# Patient Record
Sex: FEMALE | Race: WHITE | NOT HISPANIC OR LATINO | Employment: OTHER | URBAN - METROPOLITAN AREA
[De-identification: names, ages, dates, MRNs, and addresses within clinical notes are randomized per-mention and may not be internally consistent; named-entity substitution may affect disease eponyms.]

---

## 2023-06-16 ENCOUNTER — OFFICE VISIT (OUTPATIENT)
Dept: PRIMARY CARE | Facility: CLINIC | Age: 68
End: 2023-06-16
Payer: MEDICARE

## 2023-06-16 VITALS
SYSTOLIC BLOOD PRESSURE: 120 MMHG | WEIGHT: 172.8 LBS | BODY MASS INDEX: 29.66 KG/M2 | DIASTOLIC BLOOD PRESSURE: 80 MMHG | TEMPERATURE: 98 F

## 2023-06-16 DIAGNOSIS — I10 PRIMARY HYPERTENSION: Primary | ICD-10-CM

## 2023-06-16 DIAGNOSIS — N81.10 FEMALE BLADDER PROLAPSE: ICD-10-CM

## 2023-06-16 DIAGNOSIS — Z12.31 ENCOUNTER FOR SCREENING MAMMOGRAM FOR MALIGNANT NEOPLASM OF BREAST: ICD-10-CM

## 2023-06-16 DIAGNOSIS — K64.9 HEMORRHOIDS, UNSPECIFIED HEMORRHOID TYPE: ICD-10-CM

## 2023-06-16 PROCEDURE — 1160F RVW MEDS BY RX/DR IN RCRD: CPT | Performed by: NURSE PRACTITIONER

## 2023-06-16 PROCEDURE — 1159F MED LIST DOCD IN RCRD: CPT | Performed by: NURSE PRACTITIONER

## 2023-06-16 PROCEDURE — 3079F DIAST BP 80-89 MM HG: CPT | Performed by: NURSE PRACTITIONER

## 2023-06-16 PROCEDURE — 1036F TOBACCO NON-USER: CPT | Performed by: NURSE PRACTITIONER

## 2023-06-16 PROCEDURE — 3074F SYST BP LT 130 MM HG: CPT | Performed by: NURSE PRACTITIONER

## 2023-06-16 PROCEDURE — 99214 OFFICE O/P EST MOD 30 MIN: CPT | Performed by: NURSE PRACTITIONER

## 2023-06-16 RX ORDER — HYDROCORTISONE ACETATE 25 MG/1
SUPPOSITORY RECTAL
COMMUNITY
End: 2023-06-16 | Stop reason: SDUPTHER

## 2023-06-16 RX ORDER — LOSARTAN POTASSIUM AND HYDROCHLOROTHIAZIDE 12.5; 1 MG/1; MG/1
1 TABLET ORAL DAILY
Qty: 90 TABLET | Refills: 3 | Status: SHIPPED | OUTPATIENT
Start: 2023-06-16 | End: 2024-05-23 | Stop reason: SDUPTHER

## 2023-06-16 RX ORDER — ONDANSETRON 4 MG/1
TABLET, ORALLY DISINTEGRATING ORAL 3 TIMES DAILY PRN
COMMUNITY
Start: 2016-02-03

## 2023-06-16 RX ORDER — IBUPROFEN 800 MG/1
1 TABLET ORAL 3 TIMES DAILY PRN
COMMUNITY
Start: 2014-11-14

## 2023-06-16 RX ORDER — LOSARTAN POTASSIUM AND HYDROCHLOROTHIAZIDE 12.5; 1 MG/1; MG/1
1 TABLET ORAL DAILY
COMMUNITY
End: 2023-06-16 | Stop reason: SDUPTHER

## 2023-06-16 RX ORDER — CLOBETASOL PROPIONATE 0.5 MG/G
CREAM TOPICAL AS NEEDED
COMMUNITY
Start: 2022-08-03 | End: 2023-12-13 | Stop reason: WASHOUT

## 2023-06-16 RX ORDER — SUMATRIPTAN 20 MG/1
SPRAY NASAL AS NEEDED
COMMUNITY
Start: 2016-10-18

## 2023-06-16 RX ORDER — ASPIRIN 81 MG/1
TABLET ORAL ONCE
COMMUNITY
Start: 2021-06-15

## 2023-06-16 RX ORDER — HYDROCORTISONE ACETATE 25 MG/1
25 SUPPOSITORY RECTAL 2 TIMES DAILY PRN
Qty: 24 SUPPOSITORY | Refills: 1 | Status: SHIPPED | OUTPATIENT
Start: 2023-06-16 | End: 2024-05-23 | Stop reason: SDUPTHER

## 2023-06-16 RX ORDER — CHOLECALCIFEROL (VITAMIN D3) 125 MCG
1 CAPSULE ORAL DAILY
COMMUNITY
Start: 2020-12-14

## 2023-06-16 RX ORDER — RIZATRIPTAN BENZOATE 10 MG/1
TABLET ORAL ONCE AS NEEDED
COMMUNITY
Start: 2015-10-21

## 2023-06-16 ASSESSMENT — PATIENT HEALTH QUESTIONNAIRE - PHQ9: 2. FEELING DOWN, DEPRESSED OR HOPELESS: NOT AT ALL

## 2023-06-16 NOTE — PROGRESS NOTES
Subjective   Patient ID: Janie Zhong is a 68 y.o. female who presents for Med Refill (Hydrocortisone AC and Losartan- HCTZ ).    HPI   Back from [facility] in May  Has been on [employer]  on and off - [employer]  and is interested in [employer] [facility] friends on this.  Feeling better at lower weight  Was seeing Aline Cho, GYN started her on ASA years ago.  Has had ongoing issues with Hemorrhoid.  Was told that her bladder was falling a little.    Review of Systems   All other systems reviewed and are negative.        Objective   /80 (BP Location: Left arm)   Temp 36.7 °C (98 °F) (Temporal)   Wt 78.4 kg (172 lb 12.8 oz)   BMI 29.66 kg/m²     Physical Exam  Vitals and nursing note reviewed.   Constitutional:       Appearance: Normal appearance.   HENT:      Head: Normocephalic and atraumatic.      Right Ear: Tympanic membrane and ear canal normal.      Mouth/Throat:      Pharynx: Oropharynx is clear.   Cardiovascular:      Rate and Rhythm: Normal rate and regular rhythm.      Heart sounds: Normal heart sounds.   Pulmonary:      Effort: Pulmonary effort is normal.      Breath sounds: Normal breath sounds.   Abdominal:      General: Bowel sounds are normal.      Palpations: Abdomen is soft.   Musculoskeletal:      Cervical back: Normal range of motion and neck supple.   Skin:     General: Skin is warm.   Neurological:      Mental Status: She is alert and oriented to person, place, and time. Mental status is at baseline.   Psychiatric:         Mood and Affect: Mood normal.         Behavior: Behavior normal.         Thought Content: Thought content normal.         Judgment: Judgment normal.           Assessment/Plan   Problem List Items Addressed This Visit       Encounter for screening mammogram for malignant neoplasm of breast    Relevant Orders    BI mammo bilateral screening tomosynthesis    Female bladder prolapse    Relevant Orders    Referral to Urogynecology    Hemorrhoids    Relevant  Medications    hydrocortisone (Anusol-HC) 25 mg suppository    Primary hypertension - Primary    Relevant Medications    losartan-hydrochlorothiazide (Hyzaar) 100-12.5 mg tablet

## 2023-06-16 NOTE — PATIENT INSTRUCTIONS
Good to see you today.    Refill of medications as needed.  STOP the ASA 81    Mammogram - you can schedule at [phone]  REFER to URO/GYN you can schedule at [phone]  Plan to do fasting labs before wellness visit in December.   Let me know if you need anything.

## 2023-06-27 PROBLEM — Z12.31 ENCOUNTER FOR SCREENING MAMMOGRAM FOR MALIGNANT NEOPLASM OF BREAST: Status: ACTIVE | Noted: 2023-06-27

## 2023-06-27 PROBLEM — I10 PRIMARY HYPERTENSION: Status: ACTIVE | Noted: 2023-06-27

## 2023-06-27 PROBLEM — N81.10 FEMALE BLADDER PROLAPSE: Status: ACTIVE | Noted: 2023-06-27

## 2023-06-27 PROBLEM — K64.9 HEMORRHOIDS: Status: ACTIVE | Noted: 2023-06-27

## 2023-07-17 ENCOUNTER — TELEPHONE (OUTPATIENT)
Dept: PRIMARY CARE | Facility: CLINIC | Age: 68
End: 2023-07-17
Payer: MEDICARE

## 2023-07-17 DIAGNOSIS — R92.8 ABNORMAL MAMMOGRAM OF LEFT BREAST: Primary | ICD-10-CM

## 2023-07-17 DIAGNOSIS — N63.20 MASS OF LEFT BREAST, UNSPECIFIED QUADRANT: ICD-10-CM

## 2023-07-17 NOTE — RESULT ENCOUNTER NOTE
Please let pt know that the radiologist is requesting additional imaging of left breast.  I have ordered Diagnostic and U/S  and she can schedule thru radiology.  I would recommend the breast center at [facility]

## 2023-07-17 NOTE — TELEPHONE ENCOUNTER
----- Message from MIRZA Avelar-CNP sent at 7/17/2023 12:08 PM EDT -----  Please let pt know that the radiologist is requesting additional imaging of left breast.  I have ordered Diagnostic and U/S  and she can schedule thru radiology.  I would recommend the breast center at [facility]

## 2023-07-18 ENCOUNTER — TELEPHONE (OUTPATIENT)
Dept: PRIMARY CARE | Facility: CLINIC | Age: 68
End: 2023-07-18
Payer: MEDICARE

## 2023-07-18 NOTE — RESULT ENCOUNTER NOTE
I tried to call pt - [facility]; then tried to send My Chart but not yet set up.  So the radiologist is requesting additional views of left breast.  Usually this means Diagnostic Mammogram and U/S   When I went to put in order, it looked like there was one.  Did she talk with someone yesterday???

## 2023-07-18 NOTE — TELEPHONE ENCOUNTER
----- Message from MIRZA Avelar-CNP sent at 7/18/2023  1:14 PM EDT -----  I tried to call pt - got ; then tried to send My Chart but not yet set up.  So the radiologist is requesting additional views of left breast.  Usually this means Diagnostic Mammogram and U/S   When I went to put in order, it looked like there was one.  Did she talk with someone yesterday???

## 2023-07-20 ENCOUNTER — TELEPHONE (OUTPATIENT)
Dept: PRIMARY CARE | Facility: CLINIC | Age: 68
End: 2023-07-20
Payer: MEDICARE

## 2023-07-20 NOTE — TELEPHONE ENCOUNTER
----- Message from Lea Eaton MA sent at 7/17/2023 12:49 PM EDT -----  Regarding: Oskar request to return message  Sj Lorenzo here is the results you asked for.  ----- Message -----  From: MIRZA Avelar-CNP  Sent: 7/17/2023  12:08 PM EDT  To: [place] [id number] Clinical Support Staff    Please let pt know that the radiologist is requesting additional imaging of left breast.  I have ordered Diagnostic and U/S  and she can schedule thru radiology.  I would recommend the breast center at [facility]

## 2023-07-26 DIAGNOSIS — L23.9 ALLERGIC DERMATITIS: Primary | ICD-10-CM

## 2023-07-26 RX ORDER — CLOBETASOL PROPIONATE 0.5 MG/G
CREAM TOPICAL 2 TIMES DAILY
Qty: 30 G | Refills: 1 | Status: SHIPPED | OUTPATIENT
Start: 2023-07-26 | End: 2023-12-13 | Stop reason: WASHOUT

## 2023-08-04 ENCOUNTER — TELEPHONE (OUTPATIENT)
Dept: PRIMARY CARE | Facility: CLINIC | Age: 68
End: 2023-08-04
Payer: MEDICARE

## 2023-08-04 DIAGNOSIS — R92.8 ABNORMAL MAMMOGRAM: Primary | ICD-10-CM

## 2023-08-07 ENCOUNTER — TELEPHONE (OUTPATIENT)
Dept: PRIMARY CARE | Facility: CLINIC | Age: 68
End: 2023-08-07

## 2023-08-07 ENCOUNTER — OFFICE VISIT (OUTPATIENT)
Dept: PRIMARY CARE | Facility: CLINIC | Age: 68
End: 2023-08-07
Payer: MEDICARE

## 2023-08-07 VITALS — SYSTOLIC BLOOD PRESSURE: 112 MMHG | WEIGHT: 171.8 LBS | DIASTOLIC BLOOD PRESSURE: 80 MMHG | BODY MASS INDEX: 29.49 KG/M2

## 2023-08-07 DIAGNOSIS — L23.7 POISON IVY DERMATITIS: Primary | ICD-10-CM

## 2023-08-07 DIAGNOSIS — R92.8 ABNORMAL MAMMOGRAM: Primary | ICD-10-CM

## 2023-08-07 PROCEDURE — 1160F RVW MEDS BY RX/DR IN RCRD: CPT | Performed by: INTERNAL MEDICINE

## 2023-08-07 PROCEDURE — 3074F SYST BP LT 130 MM HG: CPT | Performed by: INTERNAL MEDICINE

## 2023-08-07 PROCEDURE — 3079F DIAST BP 80-89 MM HG: CPT | Performed by: INTERNAL MEDICINE

## 2023-08-07 PROCEDURE — 1036F TOBACCO NON-USER: CPT | Performed by: INTERNAL MEDICINE

## 2023-08-07 PROCEDURE — 99213 OFFICE O/P EST LOW 20 MIN: CPT | Performed by: INTERNAL MEDICINE

## 2023-08-07 PROCEDURE — 1159F MED LIST DOCD IN RCRD: CPT | Performed by: INTERNAL MEDICINE

## 2023-08-07 RX ORDER — TRIAMCINOLONE ACETONIDE 1 MG/G
CREAM TOPICAL
COMMUNITY
Start: 2023-06-22 | End: 2023-12-13 | Stop reason: WASHOUT

## 2023-08-07 RX ORDER — ESTRADIOL 0.1 MG/G
CREAM VAGINAL
COMMUNITY
Start: 2023-06-23

## 2023-08-07 RX ORDER — METHYLPREDNISOLONE 4 MG/1
TABLET ORAL
Qty: 21 TABLET | Refills: 0 | Status: SHIPPED | OUTPATIENT
Start: 2023-08-07 | End: 2023-12-13 | Stop reason: WASHOUT

## 2023-08-07 ASSESSMENT — PATIENT HEALTH QUESTIONNAIRE - PHQ9
SUM OF ALL RESPONSES TO PHQ9 QUESTIONS 1 AND 2: 0
1. LITTLE INTEREST OR PLEASURE IN DOING THINGS: NOT AT ALL
2. FEELING DOWN, DEPRESSED OR HOPELESS: NOT AT ALL

## 2023-08-07 NOTE — TELEPHONE ENCOUNTER
----- Message from AB Avelar sent at 8/7/2023  6:51 AM EDT -----  Regarding: FW: Poison Naty  Contact: [phone]  See if anyone can see her today.  Otherwise, put her with me tomorrow.  THANKS  ----- Message -----  From: Rebeca Oliva CMA  Sent: 8/7/2023   6:35 AM EDT  To: AB Avelar  Subject: FW: Poison Ivy                                     ----- Message -----  From: Janie Zhong  Sent: 8/6/2023   7:38 AM EDT  To:  [place] [id number] Clinical Support Staff  Subject: Poison Ivy                                       Hi Maura,    The poison ivy is spreading  on my hips, Stomach, back, and now on my breast.  Should I come in and see you?  Maybe I need a prednisone shot.  The cream you prescribed helped a bit on my forearms , but very expensive.  The small tube was $80 with my insurance coverage.      Thanks,  Danyell

## 2023-08-07 NOTE — PROGRESS NOTES
Subjective   Patient ID: [id number]   [facility]    Janie Zhong is a 68 y.o. female who presents for Poison Ivy (Located on hip and down her back).ity has been going on for 7 days and she was using steroid cream but it is not working.        Objective   Visit Vitals  /80 (BP Location: Left arm, Patient Position: Sitting)      Review of Systems  All 12 systems reviewed, no other abnormality except that mentioned in HPI.    Physical Exam  Constitutional- no abnormality  ENT- no abnormality  Neck- no swelling  CVS- normal S1 and S2, no murmur.  Pulmonary- clear to auscultation,no rhonchi, no wheezes.  Abdomen- normal- liver and spleen, soft, no distension, bowel sound present.  Neurological- all cranial nerves intact, speech and gait normal, no sensory or motor deficiency.  Musculoskeletal- all pulses are normal, normal movement, no joint swelling.  Skin-  red rashes on both buttocks.  psychiatry- no suicidal ideation.  Lymph node- no lyphadenopathy      Assessment/Plan   Problem List Items Addressed This Visit       Poison ivy dermatitis - Primary     Steroid pack.            Michelle Kapoor MD

## 2023-11-09 ENCOUNTER — ANCILLARY PROCEDURE (OUTPATIENT)
Dept: RADIOLOGY | Facility: CLINIC | Age: 68
End: 2023-11-09
Payer: MEDICARE

## 2023-11-09 DIAGNOSIS — N20.0 CALCULUS OF KIDNEY: ICD-10-CM

## 2023-11-09 PROCEDURE — 76770 US EXAM ABDO BACK WALL COMP: CPT | Performed by: RADIOLOGY

## 2023-11-09 PROCEDURE — 76770 US EXAM ABDO BACK WALL COMP: CPT

## 2023-11-20 ENCOUNTER — TELEMEDICINE (OUTPATIENT)
Dept: UROLOGY | Facility: CLINIC | Age: 68
End: 2023-11-20
Payer: MEDICARE

## 2023-11-20 DIAGNOSIS — N20.0 NEPHROLITHIASIS: Primary | ICD-10-CM

## 2023-11-20 PROCEDURE — 99213 OFFICE O/P EST LOW 20 MIN: CPT | Performed by: UROLOGY

## 2023-11-20 NOTE — PROGRESS NOTES
Subjective   This visit was completed via telemedicine. All issues as below were discussed and addressed but no physical exam was performed unless allowed by visual confirmation. If it was felt that the patient should be evaluated in clinic, then they were directed there. Patient verbally consented to visit.    Janie Zhong is a 68 y.o. female with history of gross hematuria with negative workup and nephrolithiasis presenting today for a follow up visit.  She has no new complaints.     I reviewed renal US from 11/9/2023 which shows no definite renal stones or hydronephrosis.           Past Medical History:   Diagnosis Date    Abnormal levels of other serum enzymes 04/16/2019    Abnormal liver enzymes    Cough, unspecified 11/14/2014    Cough    Cough, unspecified 05/21/2014    Cough    Essential (primary) hypertension 04/02/2013    Benign essential hypertension    Hirsutism     Hirsutism    Melena     Blood in stool    Other conditions influencing health status 07/09/2018    History of cough    Personal history of diseases of the skin and subcutaneous tissue     History of alopecia    Personal history of other diseases of the nervous system and sense organs     History of migraine    Personal history of other diseases of the respiratory system 11/14/2014    History of upper respiratory infection    Personal history of other diseases of the respiratory system 07/09/2018    History of acute bronchitis    Personal history of other diseases of the respiratory system 09/22/2017    History of bronchitis     Past Surgical History:   Procedure Laterality Date    TOTAL ABDOMINAL HYSTERECTOMY W/ BILATERAL SALPINGOOPHORECTOMY  02/27/2014    Total Abdominal Hysterectomy With Removal Of Both Ovaries     Family History   Problem Relation Name Age of Onset    Lung cancer Mother      COPD Father      Emphysema Father      Lupus Sister      Lung cancer Brother      No Known Problems Son      No Known Problems Mother's Sister       Breast cancer Maternal Grandmother       Current Outpatient Medications   Medication Sig Dispense Refill    aspirin 81 mg EC tablet Take by mouth 1 time.      cholecalciferol (Vitamin D-3) 125 MCG (5000 UT) capsule Take 1 capsule (125 mcg) by mouth once daily.      clobetasol (Temovate) 0.05 % cream if needed.      clobetasol (Temovate) 0.05 % cream Apply topically 2 times a day. (Patient not taking: Reported on 8/7/2023) 30 g 1    estradiol (Estrace) 0.01 % (0.1 mg/gram) vaginal cream USE A PEA SIZED AMOUNT IN THE VAGINA TWICE WEEKLY      hydrocortisone (Anusol-HC) 25 mg suppository Insert 1 suppository (25 mg) into the rectum 2 times a day as needed for hemorrhoids. 24 suppository 1    ibuprofen 800 mg tablet Take 1 tablet (800 mg) by mouth 3 times a day as needed.      losartan-hydrochlorothiazide (Hyzaar) 100-12.5 mg tablet Take 1 tablet by mouth once daily. 90 tablet 3    methylPREDNISolone (Medrol Dospak) 4 mg tablets Take as directed on package. 21 tablet 0    ondansetron ODT (Zofran-ODT) 4 mg disintegrating tablet Take by mouth 3 times a day as needed.      rizatriptan (Maxalt) 10 mg tablet Take by mouth 1 time if needed.      SUMAtriptan (Imitrex) 20 mg/actuation nasal spray Administer into affected nostril(s) if needed for migraine.      triamcinolone (Kenalog) 0.1 % cream APPLY TO BACK TWICE DAILY FOR 2 WEEKS AS NEEDED FOR ITCH       No current facility-administered medications for this visit.     Allergies   Allergen Reactions    Verapamil Other     Social History     Socioeconomic History    Marital status:      Spouse name: Not on file    Number of children: Not on file    Years of education: Not on file    Highest education level: Not on file   Occupational History    Not on file   Tobacco Use    Smoking status: Never    Smokeless tobacco: Never   Vaping Use    Vaping Use: Never used   Substance and Sexual Activity    Alcohol use: Yes     Comment: rarely    Drug use: Never    Sexual  activity: Not on file   Other Topics Concern    Not on file   Social History Narrative    Not on file     Social Determinants of Health     Financial Resource Strain: Not on file   Food Insecurity: Not on file   Transportation Needs: Not on file   Physical Activity: Not on file   Stress: Not on file   Social Connections: Not on file   Intimate Partner Violence: Not on file   Housing Stability: Not on file       Review of Systems  Pertinent items are noted in HPI.    Objective     Lab Review  Lab Results   Component Value Date    WBC 5.5 06/10/2022    RBC 5.03 06/10/2022    HGB 15.3 06/10/2022    HCT 46.8 (H) 06/10/2022     06/10/2022      Lab Results   Component Value Date    BUN 11 12/01/2022    CREATININE 0.69 12/01/2022     Assessment/Plan   Diagnoses and all orders for this visit:  Nephrolithiasis  -     US renal complete; Future    Nephrolithiasis     I reviewed renal US from 11/9/2023 which shows no definite renal stones or hydronephrosis.     We will continue to monitor with annual renal US.     All questions were answered to the patient's satisfaction. Patient agrees with the plan and wishes to proceed. Follow-up will be scheduled appropriately. ;;    Scribed for Dr. Helton by Deidra Beltrán. I , Dr Helton, have personally reviewed and agreed with the information entered by the Virtual Scribe.     Deidra Beltrán

## 2023-12-13 ENCOUNTER — OFFICE VISIT (OUTPATIENT)
Dept: PRIMARY CARE | Facility: CLINIC | Age: 68
End: 2023-12-13
Payer: MEDICARE

## 2023-12-13 ENCOUNTER — LAB (OUTPATIENT)
Dept: LAB | Facility: LAB | Age: 68
End: 2023-12-13
Payer: MEDICARE

## 2023-12-13 VITALS
BODY MASS INDEX: 28.49 KG/M2 | TEMPERATURE: 97.3 F | WEIGHT: 171 LBS | SYSTOLIC BLOOD PRESSURE: 124 MMHG | DIASTOLIC BLOOD PRESSURE: 80 MMHG | HEIGHT: 65 IN | HEART RATE: 78 BPM | OXYGEN SATURATION: 98 %

## 2023-12-13 DIAGNOSIS — E55.9 VITAMIN D DEFICIENCY: ICD-10-CM

## 2023-12-13 DIAGNOSIS — R53.83 OTHER FATIGUE: ICD-10-CM

## 2023-12-13 DIAGNOSIS — R73.03 PRE-DIABETES: ICD-10-CM

## 2023-12-13 DIAGNOSIS — I10 PRIMARY HYPERTENSION: ICD-10-CM

## 2023-12-13 DIAGNOSIS — E78.5 HYPERLIPIDEMIA, UNSPECIFIED HYPERLIPIDEMIA TYPE: ICD-10-CM

## 2023-12-13 DIAGNOSIS — Z00.00 ROUTINE GENERAL MEDICAL EXAMINATION AT A HEALTH CARE FACILITY: Primary | ICD-10-CM

## 2023-12-13 PROBLEM — K64.9 HEMORRHOID: Status: ACTIVE | Noted: 2023-12-13

## 2023-12-13 PROBLEM — M54.41 ACUTE RIGHT-SIDED BACK PAIN WITH SCIATICA: Status: ACTIVE | Noted: 2023-12-13

## 2023-12-13 PROBLEM — R63.5 ABNORMAL WEIGHT GAIN: Status: ACTIVE | Noted: 2023-12-13

## 2023-12-13 PROBLEM — R30.0 DYSURIA: Status: ACTIVE | Noted: 2023-12-13

## 2023-12-13 PROBLEM — R53.82 CHRONIC FATIGUE: Status: ACTIVE | Noted: 2023-12-13

## 2023-12-13 PROBLEM — M25.551 RIGHT HIP PAIN: Status: ACTIVE | Noted: 2023-12-13

## 2023-12-13 PROBLEM — M54.50 CHRONIC RIGHT-SIDED LOW BACK PAIN: Status: ACTIVE | Noted: 2023-12-13

## 2023-12-13 PROBLEM — R19.00 MASS OF ABDOMEN: Status: ACTIVE | Noted: 2023-12-13

## 2023-12-13 PROBLEM — E66.9 OBESITY (BMI 30-39.9): Status: ACTIVE | Noted: 2023-12-13

## 2023-12-13 PROBLEM — K62.5 BRIGHT RED RECTAL BLEEDING: Status: ACTIVE | Noted: 2023-12-13

## 2023-12-13 PROBLEM — K60.2 RECTAL FISSURE: Status: ACTIVE | Noted: 2023-12-13

## 2023-12-13 PROBLEM — G57.01 PIRIFORMIS SYNDROME OF RIGHT SIDE: Status: ACTIVE | Noted: 2023-12-13

## 2023-12-13 PROBLEM — R51.9 HEADACHE: Status: ACTIVE | Noted: 2023-12-13

## 2023-12-13 PROBLEM — R73.9 HYPERGLYCEMIA: Status: ACTIVE | Noted: 2023-12-13

## 2023-12-13 PROBLEM — R60.0 EDEMA OF BOTH LEGS: Status: ACTIVE | Noted: 2023-12-13

## 2023-12-13 PROBLEM — M15.9 GENERALIZED OSTEOARTHRITIS OF MULTIPLE SITES: Status: ACTIVE | Noted: 2023-12-13

## 2023-12-13 PROBLEM — N89.8 VAGINAL DRYNESS: Status: ACTIVE | Noted: 2023-12-13

## 2023-12-13 PROBLEM — R05.8 ALLERGIC COUGH: Status: ACTIVE | Noted: 2023-12-13

## 2023-12-13 PROBLEM — R31.9 HEMATURIA: Status: ACTIVE | Noted: 2023-12-13

## 2023-12-13 PROBLEM — G89.29 CHRONIC RIGHT-SIDED LOW BACK PAIN: Status: ACTIVE | Noted: 2023-12-13

## 2023-12-13 PROBLEM — L64.9 ANDROGENETIC ALOPECIA: Status: ACTIVE | Noted: 2023-12-13

## 2023-12-13 PROBLEM — K57.32 DIVERTICULITIS OF COLON: Status: ACTIVE | Noted: 2023-12-13

## 2023-12-13 PROBLEM — K59.00 CONSTIPATION: Status: ACTIVE | Noted: 2023-12-13

## 2023-12-13 PROBLEM — R10.9 RIGHT FLANK PAIN: Status: ACTIVE | Noted: 2023-12-13

## 2023-12-13 PROBLEM — B02.29 PHN (POSTHERPETIC NEURALGIA): Status: ACTIVE | Noted: 2023-12-13

## 2023-12-13 LAB
25(OH)D3 SERPL-MCNC: 36 NG/ML (ref 30–100)
ALBUMIN SERPL BCP-MCNC: 4.4 G/DL (ref 3.4–5)
ALP SERPL-CCNC: 98 U/L (ref 33–136)
ALT SERPL W P-5'-P-CCNC: 30 U/L (ref 7–45)
ANION GAP SERPL CALC-SCNC: 10 MMOL/L (ref 10–20)
AST SERPL W P-5'-P-CCNC: 26 U/L (ref 9–39)
BILIRUB SERPL-MCNC: 0.6 MG/DL (ref 0–1.2)
BUN SERPL-MCNC: 16 MG/DL (ref 6–23)
CALCIUM SERPL-MCNC: 10.1 MG/DL (ref 8.6–10.3)
CHLORIDE SERPL-SCNC: 102 MMOL/L (ref 98–107)
CHOLEST SERPL-MCNC: 231 MG/DL (ref 0–199)
CHOLESTEROL/HDL RATIO: 3.6
CO2 SERPL-SCNC: 29 MMOL/L (ref 21–32)
CREAT SERPL-MCNC: 0.68 MG/DL (ref 0.5–1.05)
ERYTHROCYTE [DISTWIDTH] IN BLOOD BY AUTOMATED COUNT: 13.9 % (ref 11.5–14.5)
GFR SERPL CREATININE-BSD FRML MDRD: >90 ML/MIN/1.73M*2
GLUCOSE SERPL-MCNC: 82 MG/DL (ref 74–99)
HCT VFR BLD AUTO: 50.4 % (ref 36–46)
HDLC SERPL-MCNC: 64.7 MG/DL
HGB BLD-MCNC: 16 G/DL (ref 12–16)
LDLC SERPL CALC-MCNC: 136 MG/DL
MCH RBC QN AUTO: 29.7 PG (ref 26–34)
MCHC RBC AUTO-ENTMCNC: 31.7 G/DL (ref 32–36)
MCV RBC AUTO: 94 FL (ref 80–100)
NON HDL CHOLESTEROL: 166 MG/DL (ref 0–149)
NRBC BLD-RTO: 0 /100 WBCS (ref 0–0)
PLATELET # BLD AUTO: 232 X10*3/UL (ref 150–450)
POTASSIUM SERPL-SCNC: 4.1 MMOL/L (ref 3.5–5.3)
PROT SERPL-MCNC: 7.6 G/DL (ref 6.4–8.2)
RBC # BLD AUTO: 5.38 X10*6/UL (ref 4–5.2)
SODIUM SERPL-SCNC: 137 MMOL/L (ref 136–145)
TRIGL SERPL-MCNC: 152 MG/DL (ref 0–149)
TSH SERPL-ACNC: 1.1 MIU/L (ref 0.44–3.98)
VIT B12 SERPL-MCNC: 1144 PG/ML (ref 211–911)
VLDL: 30 MG/DL (ref 0–40)
WBC # BLD AUTO: 6.8 X10*3/UL (ref 4.4–11.3)

## 2023-12-13 PROCEDURE — 1170F FXNL STATUS ASSESSED: CPT | Performed by: NURSE PRACTITIONER

## 2023-12-13 PROCEDURE — 84443 ASSAY THYROID STIM HORMONE: CPT

## 2023-12-13 PROCEDURE — 3079F DIAST BP 80-89 MM HG: CPT | Performed by: NURSE PRACTITIONER

## 2023-12-13 PROCEDURE — 85027 COMPLETE CBC AUTOMATED: CPT

## 2023-12-13 PROCEDURE — 3074F SYST BP LT 130 MM HG: CPT | Performed by: NURSE PRACTITIONER

## 2023-12-13 PROCEDURE — G0439 PPPS, SUBSEQ VISIT: HCPCS | Performed by: NURSE PRACTITIONER

## 2023-12-13 PROCEDURE — 82306 VITAMIN D 25 HYDROXY: CPT

## 2023-12-13 PROCEDURE — 36415 COLL VENOUS BLD VENIPUNCTURE: CPT

## 2023-12-13 PROCEDURE — 80061 LIPID PANEL: CPT

## 2023-12-13 PROCEDURE — 82607 VITAMIN B-12: CPT

## 2023-12-13 PROCEDURE — 83036 HEMOGLOBIN GLYCOSYLATED A1C: CPT

## 2023-12-13 PROCEDURE — 1126F AMNT PAIN NOTED NONE PRSNT: CPT | Performed by: NURSE PRACTITIONER

## 2023-12-13 PROCEDURE — 1036F TOBACCO NON-USER: CPT | Performed by: NURSE PRACTITIONER

## 2023-12-13 PROCEDURE — 1159F MED LIST DOCD IN RCRD: CPT | Performed by: NURSE PRACTITIONER

## 2023-12-13 PROCEDURE — 1160F RVW MEDS BY RX/DR IN RCRD: CPT | Performed by: NURSE PRACTITIONER

## 2023-12-13 PROCEDURE — 99213 OFFICE O/P EST LOW 20 MIN: CPT | Performed by: NURSE PRACTITIONER

## 2023-12-13 PROCEDURE — 80053 COMPREHEN METABOLIC PANEL: CPT

## 2023-12-13 ASSESSMENT — ACTIVITIES OF DAILY LIVING (ADL)
TAKING_MEDICATION: INDEPENDENT
MANAGING_FINANCES: INDEPENDENT
DRESSING: INDEPENDENT
DOING_HOUSEWORK: INDEPENDENT
GROCERY_SHOPPING: INDEPENDENT
BATHING: INDEPENDENT

## 2023-12-13 ASSESSMENT — PATIENT HEALTH QUESTIONNAIRE - PHQ9
1. LITTLE INTEREST OR PLEASURE IN DOING THINGS: NOT AT ALL
2. FEELING DOWN, DEPRESSED OR HOPELESS: NOT AT ALL
SUM OF ALL RESPONSES TO PHQ9 QUESTIONS 1 AND 2: 0
1. LITTLE INTEREST OR PLEASURE IN DOING THINGS: NOT AT ALL
SUM OF ALL RESPONSES TO PHQ9 QUESTIONS 1 AND 2: 0
2. FEELING DOWN, DEPRESSED OR HOPELESS: NOT AT ALL

## 2023-12-13 NOTE — PATIENT INSTRUCTIONS
Labs today and I will follow up   Keep working on the weight loss.  You are due for Pneumococcal 20 vaccine and Consider the RSV vaccine.

## 2023-12-13 NOTE — PROGRESS NOTES
"Subjective   Patient ID: Janie Zhong is a 68 y.o. female who presents for Medicare Annual Wellness Visit Subsequent.    HPI   Here for [facility] today.  Sunitha - kidney stone  Has had no symptoms lately.    Will go to [facility] for the winter.   Spouse just had a lobectomy.  He has been doing well.  She lost weight with Seiglutide.    Feels better at lower weight.    Has gained some back.    Current with Mammogram 8/23    Current with Colonoscopy 11/22     Review of Systems   All other systems reviewed and are negative.      Objective   /80   Pulse 78   Temp 36.3 °C (97.3 °F)   Ht 1.638 m (5' 4.5\")   Wt 77.6 kg (171 lb)   SpO2 98%   BMI 28.90 kg/m²     Physical Exam  Vitals and nursing note reviewed.   Constitutional:       Appearance: Normal appearance.   HENT:      Head: Normocephalic and atraumatic.      Right Ear: Tympanic membrane, ear canal and external ear normal.      Left Ear: Tympanic membrane, ear canal and external ear normal.      Nose: Nose normal.      Mouth/Throat:      Mouth: Mucous membranes are moist.      Pharynx: Oropharynx is clear.   Eyes:      Extraocular Movements: Extraocular movements intact.      Conjunctiva/sclera: Conjunctivae normal.      Pupils: Pupils are equal, round, and reactive to light.   Neck:      Thyroid: No thyroid mass, thyromegaly or thyroid tenderness.   Cardiovascular:      Rate and Rhythm: Normal rate and regular rhythm.      Pulses: Normal pulses.      Heart sounds: Normal heart sounds.   Pulmonary:      Effort: Pulmonary effort is normal.      Breath sounds: Normal breath sounds.   Abdominal:      General: Bowel sounds are normal.      Palpations: Abdomen is soft.   Genitourinary:     Comments: Deferred  Musculoskeletal:         General: Normal range of motion.      Cervical back: Normal range of motion and neck supple.   Skin:     General: Skin is warm.      Capillary Refill: Capillary refill takes 2 to 3 seconds.   Neurological:      Mental Status: She is " alert and oriented to person, place, and time. Mental status is at baseline.   Psychiatric:         Mood and Affect: Mood normal.         Behavior: Behavior normal.         Thought Content: Thought content normal.         Judgment: Judgment normal.         Assessment/Plan   Problem List Items Addressed This Visit             ICD-10-CM    Hyperlipemia E78.5    Relevant Orders    Lipid Panel (Completed)    Other fatigue R53.83    Relevant Orders    CBC (Completed)    TSH with reflex to Free T4 if abnormal (Completed)    Vitamin B12 (Completed)    Pre-diabetes R73.03    Relevant Orders    Hemoglobin A1C (Completed)    Primary hypertension I10    Relevant Orders    Comprehensive Metabolic Panel (Completed)    Routine general medical examination at a health care facility - Primary Z00.00    Vitamin D deficiency E55.9    Relevant Orders    Vitamin D 25-Hydroxy,Total (for eval of Vitamin D levels) (Completed)

## 2023-12-14 LAB
EST. AVERAGE GLUCOSE BLD GHB EST-MCNC: 114 MG/DL
HBA1C MFR BLD: 5.6 %

## 2023-12-16 NOTE — RESULT ENCOUNTER NOTE
Labs are posted with comments. Please let me know if you have questions.  Take care and have a good time in JACQUELINE Padilla DNP

## 2023-12-26 PROBLEM — R53.83 OTHER FATIGUE: Status: ACTIVE | Noted: 2023-12-13

## 2023-12-26 PROBLEM — Z00.00 ROUTINE GENERAL MEDICAL EXAMINATION AT A HEALTH CARE FACILITY: Status: ACTIVE | Noted: 2023-06-27

## 2024-05-01 NOTE — PROGRESS NOTES
Janie Zhong female   1955 69 y.o.   [id number]      Chief Complaint  High risk evaluation    History Of Present Illness  Janie Zhong is a very pleasant 68 year old  woman [facility] for high risk evaluation. She denies trauma to the breast. She has family history of breast cancer in her maternal grandmother, 40s, maternal cousin, 40s and maternal aunt, 60s. She denies breast surgery or biopsy.     BREAST IMAGIN2023 Bilateral screening mammogram, indicates BI-RADS Category 0. Left breast mass warranting additional views. 2023 Left diagnostic mammogram and ultrasound, indicates BI-RADS Category 2. Left breast, circumscribed mass without a sonographic correlate is considered benign as it demonstrates a similar morphology to bilateral benign circumscribed masses over 10 year span of stability.      FEMALE HISTORY: menarche age 14, , first birth age 24, did not breastfeed, no OCP's, surgical menopause age 46 (TH-BSO due to benign fibroids), HRT x 10+ years (Estradiol patch), current use of Estradiol cream for vaginal dryness, scattered fibroglandular tissue     FAMILY CANCER HISTORY:  Maternal Grandmother: Breast cancer, 40s  Maternal Aunt: Breast cancer, 40s  Maternal Cousin: Breast cancer, 60s  Mother: Lung cancer, age 59, smoker  Brother: Lung cancer, 50s, smoker     Surgical History  She has a past surgical history that includes Total abdominal hysterectomy w/ bilateral salpingoophorectomy (2014) and Fetal surgery for congenital hernia.     Social History  She reports that she has never smoked. She has never used smokeless tobacco. She reports current alcohol use. She reports that she does not use drugs.    Family History  Family History   Problem Relation Name Age of Onset    Lung cancer Mother      COPD Father      Emphysema Father      Lupus Sister      Lung cancer Brother      No Known Problems Son      No Known Problems Mother's Sister       Breast cancer Maternal Grandmother          Allergies  Verapamil    Medications  Current Outpatient Medications   Medication Instructions    aspirin 81 mg EC tablet oral, Once    cholecalciferol (Vitamin D-3) 125 MCG (5000 UT) capsule 1 capsule, oral, Daily    estradiol (Estrace) 0.01 % (0.1 mg/gram) vaginal cream USE A PEA SIZED AMOUNT IN THE VAGINA TWICE WEEKLY    hydrocortisone (ANUSOL-HC) 25 mg, rectal, 2 times daily PRN    ibuprofen 800 mg tablet 1 tablet, oral, 3 times daily PRN    losartan-hydrochlorothiazide (Hyzaar) 100-12.5 mg tablet 1 tablet, oral, Daily    ondansetron ODT (Zofran-ODT) 4 mg disintegrating tablet oral, 3 times daily PRN    rizatriptan (Maxalt) 10 mg tablet oral, Once as needed    SUMAtriptan (Imitrex) 20 mg/actuation nasal spray nasal, As needed         REVIEW OF SYSTEMS    Constitutional:  Negative for appetite change, fatigue, fever and unexpected weight change.   HENT:  Negative for ear pain, hearing loss, nosebleeds, sore throat and trouble swallowing.    Eyes:  Negative for discharge, itching and visual disturbance.   Respiratory:  Negative for cough, chest tightness and shortness of breath.    Cardiovascular:  Negative for chest pain, palpitations and leg swelling.   Breast: as indicated in HPI  Gastrointestinal:  Negative for abdominal pain, constipation, diarrhea and nausea.   Endocrine: Negative for cold intolerance and heat intolerance.   Genitourinary:  Negative for dysuria, frequency, hematuria, pelvic pain and vaginal bleeding.   Musculoskeletal:  Negative for arthralgias, back pain, gait problem, joint swelling and myalgias.   Skin:  Negative for color change and rash.   Allergic/Immunologic: Negative for environmental allergies and food allergies.   Neurological:  Negative for dizziness, tremors, speech difficulty, weakness, numbness and headaches.   Hematological:  Does not bruise/bleed easily.   Psychiatric/Behavioral:  Negative for agitation, dysphoric mood and sleep  disturbance. The patient is not nervous/anxious.         Past Medical History  She has a past medical history of Abnormal levels of other serum enzymes (04/16/2019), Cough, unspecified (11/14/2014), Cough, unspecified (05/21/2014), Essential (primary) hypertension (04/02/2013), Hirsutism, Melena, Other conditions influencing health status (07/09/2018), Personal history of diseases of the skin and subcutaneous tissue, Personal history of other diseases of the nervous system and sense organs, Personal history of other diseases of the respiratory system (11/14/2014), Personal history of other diseases of the respiratory system (07/09/2018), and Personal history of other diseases of the respiratory system (09/22/2017).     Physical Exam  Patient is alert and oriented x3 and in a relaxed and appropriate mood. Her gait is steady and hand grasps are equal. Sclera is clear. The breasts are nearly symmetrical. The tissue is soft without palpable abnormalities, discrete nodules or masses. The nipples appear normal. There are multiple lesions of resolving poison ivy on breasts, chest and abdomen. There is no cervical, supraclavicular or axillary lymphadenopathy. Heart rate and rhythm normal, S1 and S2 appreciated. The lungs are clear to auscultation bilaterally. Abdomen is soft and non-tender     Physical Exam     Last Recorded Vitals  There were no vitals filed for this visit.    Relevant Results   Time was spent viewing digital images of the radiology testing with the patient. I explained the results in depth, along with suggested explanation for follow up recommendations based on the testing results. BI-RADS Category    Imaging         Assessment/Plan   High risk surveillance care, normal clinical exam, left breast mass, benign, family history of breast cancer, scattered fibroglandular tissue     Plan: Return in July 2025 for bilateral screening mammogram and office visit.     Patient Discussion/Summary  Your clinical  examination is normal. Proceed to annual mammogram in July 2024, I will call you if the results are abnormal. Please return in July 2025 for bilateral screening mammogram and office visit or sooner if you have any problems or concerns.     High risk breast surveillance care plan:  Yearly mammogram with digital breast tomosynthesis  Twice yearly clinical breast examinations  Breast MRI  Monthly self breast examinations  Vitamin D3 2000 IU/daily (over the counter)  Exercise 3-4 times per week for 45-60 minutes  Limit alcohol to 3-4 drinks per week   Eat a healthy low-fat diet with lots of fruits and vegetables  Risk models indicate personal risk of breast cancer in the next five years and lifetime (age 90)  Breast Cancer Risk Assessment Tool (Eliana): 5 year risk % (average %) and lifetime risk % (average %)  Anthony: 5 year risk % (average %) and lifetime risk % (average %)   Risk model indicates you are eligible for endocrine therapy with Tamoxifen, Raloxifene or Exemestane. Endocrine therapy reduces lifetime risk of breast cancer by 50% when taken for 5 years.    You can see your health information, review clinical summaries from office visits & test results online when you follow your health with MY  Chart, a personal health record. To sign up go to [email or web address]. If you need assistance with signing up or trouble getting into your account call [employer] Patient Line 24/7 at [phone].    My office phone number is [phone] if you need to get in touch with me or have additional questions or concerns. Thank you for choosing [facility] and trusting me as your healthcare provider. I look forward to seeing you again at your next office visit. I am honored to be a provider on your health care team and I remain dedicated to helping you achieve your health goals.      Clarissa Villaseñor, APRN-CNP

## 2024-05-08 ENCOUNTER — APPOINTMENT (OUTPATIENT)
Dept: PRIMARY CARE | Facility: CLINIC | Age: 69
End: 2024-05-08
Payer: MEDICARE

## 2024-05-14 ENCOUNTER — OFFICE VISIT (OUTPATIENT)
Dept: PRIMARY CARE | Facility: CLINIC | Age: 69
End: 2024-05-14
Payer: MEDICARE

## 2024-05-14 ENCOUNTER — LAB (OUTPATIENT)
Dept: LAB | Facility: LAB | Age: 69
End: 2024-05-14
Payer: MEDICARE

## 2024-05-14 VITALS
WEIGHT: 165 LBS | BODY MASS INDEX: 27.88 KG/M2 | OXYGEN SATURATION: 93 % | SYSTOLIC BLOOD PRESSURE: 132 MMHG | TEMPERATURE: 98.4 F | DIASTOLIC BLOOD PRESSURE: 88 MMHG | HEART RATE: 104 BPM

## 2024-05-14 DIAGNOSIS — R59.9 ADENOPATHY: ICD-10-CM

## 2024-05-14 DIAGNOSIS — J01.00 SUBACUTE MAXILLARY SINUSITIS: Primary | ICD-10-CM

## 2024-05-14 DIAGNOSIS — J02.9 PHARYNGITIS, UNSPECIFIED ETIOLOGY: ICD-10-CM

## 2024-05-14 LAB
BASOPHILS # BLD AUTO: 0.04 X10*3/UL (ref 0–0.1)
BASOPHILS NFR BLD AUTO: 0.4 %
EOSINOPHIL # BLD AUTO: 0.18 X10*3/UL (ref 0–0.7)
EOSINOPHIL NFR BLD AUTO: 1.9 %
ERYTHROCYTE [DISTWIDTH] IN BLOOD BY AUTOMATED COUNT: 13.2 % (ref 11.5–14.5)
HCT VFR BLD AUTO: 46.6 % (ref 36–46)
HGB BLD-MCNC: 15.5 G/DL (ref 12–16)
IMM GRANULOCYTES # BLD AUTO: 0.05 X10*3/UL (ref 0–0.7)
IMM GRANULOCYTES NFR BLD AUTO: 0.5 % (ref 0–0.9)
LYMPHOCYTES # BLD AUTO: 1.7 X10*3/UL (ref 1.2–4.8)
LYMPHOCYTES NFR BLD AUTO: 17.9 %
MCH RBC QN AUTO: 30.6 PG (ref 26–34)
MCHC RBC AUTO-ENTMCNC: 33.3 G/DL (ref 32–36)
MCV RBC AUTO: 92 FL (ref 80–100)
MONOCYTES # BLD AUTO: 0.87 X10*3/UL (ref 0.1–1)
MONOCYTES NFR BLD AUTO: 9.2 %
NEUTROPHILS # BLD AUTO: 6.66 X10*3/UL (ref 1.2–7.7)
NEUTROPHILS NFR BLD AUTO: 70.1 %
NRBC BLD-RTO: 0 /100 WBCS (ref 0–0)
PLATELET # BLD AUTO: 265 X10*3/UL (ref 150–450)
POC RAPID INFLUENZA A: NEGATIVE
POC RAPID INFLUENZA B: NEGATIVE
POC RAPID STREP: NEGATIVE
RBC # BLD AUTO: 5.06 X10*6/UL (ref 4–5.2)
WBC # BLD AUTO: 9.5 X10*3/UL (ref 4.4–11.3)

## 2024-05-14 PROCEDURE — 36415 COLL VENOUS BLD VENIPUNCTURE: CPT

## 2024-05-14 PROCEDURE — 1160F RVW MEDS BY RX/DR IN RCRD: CPT | Performed by: NURSE PRACTITIONER

## 2024-05-14 PROCEDURE — 85025 COMPLETE CBC W/AUTO DIFF WBC: CPT

## 2024-05-14 PROCEDURE — 87804 INFLUENZA ASSAY W/OPTIC: CPT | Performed by: NURSE PRACTITIONER

## 2024-05-14 PROCEDURE — 99214 OFFICE O/P EST MOD 30 MIN: CPT | Performed by: NURSE PRACTITIONER

## 2024-05-14 PROCEDURE — 3075F SYST BP GE 130 - 139MM HG: CPT | Performed by: NURSE PRACTITIONER

## 2024-05-14 PROCEDURE — 1159F MED LIST DOCD IN RCRD: CPT | Performed by: NURSE PRACTITIONER

## 2024-05-14 PROCEDURE — G2211 COMPLEX E/M VISIT ADD ON: HCPCS | Performed by: NURSE PRACTITIONER

## 2024-05-14 PROCEDURE — 87880 STREP A ASSAY W/OPTIC: CPT | Performed by: NURSE PRACTITIONER

## 2024-05-14 PROCEDURE — 3079F DIAST BP 80-89 MM HG: CPT | Performed by: NURSE PRACTITIONER

## 2024-05-14 RX ORDER — AMOXICILLIN 875 MG/1
875 TABLET, FILM COATED ORAL 2 TIMES DAILY
Qty: 20 TABLET | Refills: 0 | Status: SHIPPED | OUTPATIENT
Start: 2024-05-14 | End: 2024-05-23 | Stop reason: WASHOUT

## 2024-05-14 ASSESSMENT — PATIENT HEALTH QUESTIONNAIRE - PHQ9
SUM OF ALL RESPONSES TO PHQ9 QUESTIONS 1 AND 2: 0
2. FEELING DOWN, DEPRESSED OR HOPELESS: NOT AT ALL
1. LITTLE INTEREST OR PLEASURE IN DOING THINGS: NOT AT ALL

## 2024-05-14 NOTE — PROGRESS NOTES
Subjective   Patient ID: Janie Zhong is a 69 y.o. female who presents for Facial Pain (Sinus pain and pressure, headache, body aches, sore throat, ear pain).    HPI   Was around a sick kid- great grandson  Took him to [employer]  Has been sick since Wednesday  Bad throat and ears.  Pain and swelling left jaw area  Cold sore on Nose  Has taken Tylenol cold and flu and Ibuprofen 600  Up and down  Had chills and felt feverish  No GI symptoms  Has not really been eating  Review of Systems   Constitutional:  Positive for appetite change, chills, fatigue and fever.        Ill appearing CF in NAD   HENT:  Positive for congestion, ear pain, facial swelling, sinus pressure and sinus pain.    All other systems reviewed and are negative.        Objective   /88   Pulse 104   Temp 36.9 °C (98.4 °F)   Wt 74.8 kg (165 lb)   SpO2 93%   BMI 27.88 kg/m²     Physical Exam  Vitals and nursing note reviewed.   Constitutional:       Appearance: Normal appearance. She is ill-appearing.   HENT:      Head: Normocephalic and atraumatic.      Right Ear: Ear canal and external ear normal.      Left Ear: Ear canal and external ear normal.      Ears:      Comments: Fluid/air line JULIO     Nose: Congestion present.      Mouth/Throat:      Comments: RED  + PND  Neck:      Comments: ACLN  Left > right  Cardiovascular:      Rate and Rhythm: Normal rate and regular rhythm.      Pulses: Normal pulses.      Heart sounds: Normal heart sounds.   Pulmonary:      Effort: Pulmonary effort is normal.      Breath sounds: Normal breath sounds.   Musculoskeletal:      Cervical back: Tenderness present. No rigidity.   Lymphadenopathy:      Cervical: Cervical adenopathy present.   Skin:     General: Skin is warm.      Findings: No rash.   Neurological:      Mental Status: She is alert and oriented to person, place, and time.   Psychiatric:         Mood and Affect: Mood normal.         Behavior: Behavior normal.         Thought Content:  Thought content normal.         Assessment/Plan   Problem List Items Addressed This Visit             ICD-10-CM    Adenopathy R59.9    Relevant Orders    CBC and Auto Differential (Completed)    Pharyngitis J02.9    Relevant Orders    POCT Influenza A/B manually resulted (Completed)    POCT rapid strep A manually resulted (Completed)    Subacute maxillary sinusitis - Primary J01.00   Amoxil  BID - take with food

## 2024-05-16 ENCOUNTER — APPOINTMENT (OUTPATIENT)
Dept: SURGICAL ONCOLOGY | Facility: CLINIC | Age: 69
End: 2024-05-16
Payer: MEDICARE

## 2024-05-23 ENCOUNTER — OFFICE VISIT (OUTPATIENT)
Dept: PRIMARY CARE | Facility: CLINIC | Age: 69
End: 2024-05-23
Payer: MEDICARE

## 2024-05-23 VITALS
BODY MASS INDEX: 26.68 KG/M2 | WEIGHT: 166 LBS | TEMPERATURE: 98 F | DIASTOLIC BLOOD PRESSURE: 78 MMHG | SYSTOLIC BLOOD PRESSURE: 120 MMHG | HEIGHT: 66 IN

## 2024-05-23 DIAGNOSIS — J02.9 PHARYNGITIS, UNSPECIFIED ETIOLOGY: ICD-10-CM

## 2024-05-23 DIAGNOSIS — I10 PRIMARY HYPERTENSION: Primary | ICD-10-CM

## 2024-05-23 DIAGNOSIS — R00.2 PALPITATIONS: ICD-10-CM

## 2024-05-23 DIAGNOSIS — K64.9 HEMORRHOIDS, UNSPECIFIED HEMORRHOID TYPE: ICD-10-CM

## 2024-05-23 DIAGNOSIS — R73.03 PRE-DIABETES: ICD-10-CM

## 2024-05-23 DIAGNOSIS — Z78.0 POSTMENOPAUSAL: ICD-10-CM

## 2024-05-23 PROBLEM — N20.0 CALCULUS OF KIDNEY: Status: ACTIVE | Noted: 2023-11-09

## 2024-05-23 PROBLEM — R10.9 ACUTE ABDOMINAL PAIN: Status: ACTIVE | Noted: 2023-12-13

## 2024-05-23 PROBLEM — Z86.69 HISTORY OF MIGRAINE: Status: ACTIVE | Noted: 2024-05-23

## 2024-05-23 PROBLEM — M25.551 PAIN OF RIGHT HIP JOINT: Status: ACTIVE | Noted: 2023-12-13

## 2024-05-23 PROBLEM — N81.10 FEMALE CYSTOCELE: Status: ACTIVE | Noted: 2023-06-27

## 2024-05-23 PROBLEM — L65.9 ALOPECIA: Status: ACTIVE | Noted: 2023-12-13

## 2024-05-23 PROBLEM — N39.0 URINARY TRACT INFECTION: Status: ACTIVE | Noted: 2024-05-23

## 2024-05-23 PROBLEM — N39.3 STRESS INCONTINENCE IN FEMALE: Status: ACTIVE | Noted: 2024-05-23

## 2024-05-23 PROBLEM — N63.0 MASS OF BREAST: Status: ACTIVE | Noted: 2024-05-23

## 2024-05-23 PROBLEM — R60.0 EDEMA OF BOTH LOWER EXTREMITIES: Status: ACTIVE | Noted: 2023-12-13

## 2024-05-23 PROBLEM — E78.5 HYPERLIPIDEMIA: Status: ACTIVE | Noted: 2023-12-13

## 2024-05-23 PROBLEM — M54.40 ACUTE BACK PAIN WITH SCIATICA: Status: ACTIVE | Noted: 2023-12-13

## 2024-05-23 PROBLEM — K62.9 RECTAL DISORDER: Status: ACTIVE | Noted: 2023-12-13

## 2024-05-23 PROBLEM — J32.9 CHRONIC SINUSITIS: Status: ACTIVE | Noted: 2024-05-23

## 2024-05-23 PROBLEM — R53.83 FATIGUE: Status: ACTIVE | Noted: 2023-12-13

## 2024-05-23 PROBLEM — N32.81 OVERACTIVE BLADDER: Status: ACTIVE | Noted: 2024-05-23

## 2024-05-23 PROBLEM — L23.7 CONTACT DERMATITIS DUE TO POISON IVY: Status: ACTIVE | Noted: 2023-08-07

## 2024-05-23 PROBLEM — B02.9 HERPES ZOSTER WITHOUT COMPLICATION: Status: ACTIVE | Noted: 2023-12-13

## 2024-05-23 PROBLEM — L23.7 POISON IVY DERMATITIS: Status: RESOLVED | Noted: 2023-08-07 | Resolved: 2024-05-23

## 2024-05-23 PROBLEM — L68.0 HIRSUTISM: Status: ACTIVE | Noted: 2024-05-23

## 2024-05-23 PROBLEM — R60.0 EDEMA OF LOWER EXTREMITY: Status: ACTIVE | Noted: 2024-05-23

## 2024-05-23 PROBLEM — E66.9 OBESITY: Status: ACTIVE | Noted: 2024-05-23

## 2024-05-23 PROBLEM — N95.2 VAGINAL ATROPHY: Status: ACTIVE | Noted: 2024-05-23

## 2024-05-23 PROBLEM — G57.00 PIRIFORMIS SYNDROME: Status: ACTIVE | Noted: 2024-05-23

## 2024-05-23 PROBLEM — L23.7 CONTACT DERMATITIS DUE TO POISON IVY: Status: RESOLVED | Noted: 2023-08-07 | Resolved: 2024-05-23

## 2024-05-23 PROBLEM — M15.9 GENERALIZED OSTEOARTHRITIS: Status: ACTIVE | Noted: 2023-12-13

## 2024-05-23 PROBLEM — M62.89 PELVIC FLOOR DYSFUNCTION: Status: ACTIVE | Noted: 2024-05-23

## 2024-05-23 PROBLEM — R19.00 ABDOMINAL MASS: Status: ACTIVE | Noted: 2023-12-13

## 2024-05-23 PROBLEM — E66.9 OBESITY WITH BODY MASS INDEX 30 OR GREATER: Status: ACTIVE | Noted: 2023-12-13

## 2024-05-23 PROCEDURE — 93000 ELECTROCARDIOGRAM COMPLETE: CPT | Performed by: NURSE PRACTITIONER

## 2024-05-23 RX ORDER — HYDROCORTISONE ACETATE 25 MG/1
25 SUPPOSITORY RECTAL 2 TIMES DAILY PRN
Qty: 24 SUPPOSITORY | Refills: 1 | Status: SHIPPED | OUTPATIENT
Start: 2024-05-23

## 2024-05-23 RX ORDER — LOSARTAN POTASSIUM AND HYDROCHLOROTHIAZIDE 12.5; 1 MG/1; MG/1
1 TABLET ORAL DAILY
Qty: 90 TABLET | Refills: 3 | Status: SHIPPED | OUTPATIENT
Start: 2024-05-23

## 2024-05-23 ASSESSMENT — PATIENT HEALTH QUESTIONNAIRE - PHQ9
1. LITTLE INTEREST OR PLEASURE IN DOING THINGS: NOT AT ALL
SUM OF ALL RESPONSES TO PHQ9 QUESTIONS 1 AND 2: 0
2. FEELING DOWN, DEPRESSED OR HOPELESS: NOT AT ALL

## 2024-05-23 NOTE — PATIENT INSTRUCTIONS
DEXA - can be done as close as [place] or at [facility]  [phone] to schedule  Medications refilled  Plan to do labs before follow up.

## 2024-05-23 NOTE — PROGRESS NOTES
"Subjective   Patient ID: Janie Zhong is a 69 y.o. female who presents for Follow-up (Review labs, med refill).    HPI   Wintered in [facility]  Cousin's wife is NP in [facility]  Also connected with DrTiffanie In [facility] when spouse was not doing great.  Weight is down  Apt in June, Dr. Clarissa Villaseñor, breast specialist.  UROLOGY, Dr Helton  following kidney stone  Still has issues with hemorrhoid    Review of Systems   Constitutional:  Positive for activity change.   Endocrine: Negative for polyuria.   Genitourinary:  Negative for dysuria and frequency.   All other systems reviewed and are negative.      Objective   /78   Temp 36.7 °C (98 °F)   Ht 1.664 m (5' 5.5\")   Wt 75.3 kg (166 lb)   BMI 27.20 kg/m²     Physical Exam  Vitals and nursing note reviewed.   Constitutional:       Appearance: Normal appearance.   HENT:      Head: Normocephalic.      Right Ear: Tympanic membrane, ear canal and external ear normal.      Left Ear: Tympanic membrane, ear canal and external ear normal.      Mouth/Throat:      Mouth: Mucous membranes are moist.      Pharynx: Oropharynx is clear.   Eyes:      Pupils: Pupils are equal, round, and reactive to light.   Neck:      Thyroid: No thyroid mass, thyromegaly or thyroid tenderness.   Cardiovascular:      Rate and Rhythm: Normal rate and regular rhythm.      Pulses: Normal pulses.      Heart sounds: Normal heart sounds.   Pulmonary:      Effort: Pulmonary effort is normal.      Breath sounds: Normal breath sounds.   Abdominal:      General: Bowel sounds are normal.      Palpations: Abdomen is soft.   Genitourinary:     Comments: Defer  Skin:     General: Skin is warm.   Neurological:      Mental Status: She is alert and oriented to person, place, and time.   Psychiatric:         Mood and Affect: Mood normal.         Behavior: Behavior normal.         Assessment/Plan   Problem List Items Addressed This Visit             ICD-10-CM    Hemorrhoids K64.9    Relevant Medications    " hydrocortisone (Anusol-HC) 25 mg suppository    Palpitations R00.2    Relevant Orders    ECG 12 lead (Clinic Performed) (Completed)    Pharyngitis J02.9     Finish antibiotic         Postmenopausal Z78.0     Due for DEXA         Relevant Orders    XR DEXA bone density (Completed)    Pre-diabetes R73.03     Continue with weight loss and dietary attention.         Primary hypertension - Primary I10     Continue with current medications.  No side effects.         Relevant Medications    losartan-hydrochlorothiazide (Hyzaar) 100-12.5 mg tablet

## 2024-05-30 ENCOUNTER — HOSPITAL ENCOUNTER (OUTPATIENT)
Dept: RADIOLOGY | Facility: CLINIC | Age: 69
Discharge: HOME | End: 2024-05-30
Payer: MEDICARE

## 2024-05-30 DIAGNOSIS — Z78.0 POSTMENOPAUSAL: ICD-10-CM

## 2024-05-30 PROCEDURE — 77080 DXA BONE DENSITY AXIAL: CPT

## 2024-05-31 ENCOUNTER — APPOINTMENT (OUTPATIENT)
Dept: RADIOLOGY | Facility: CLINIC | Age: 69
End: 2024-05-31
Payer: MEDICARE

## 2024-06-03 PROBLEM — R59.9 ADENOPATHY: Status: ACTIVE | Noted: 2024-06-03

## 2024-06-03 PROBLEM — J02.9 PHARYNGITIS: Status: ACTIVE | Noted: 2024-06-03

## 2024-06-03 PROBLEM — J01.00 SUBACUTE MAXILLARY SINUSITIS: Status: ACTIVE | Noted: 2024-06-03

## 2024-06-03 ASSESSMENT — ENCOUNTER SYMPTOMS
SINUS PAIN: 1
CHILLS: 1
FATIGUE: 1
SINUS PRESSURE: 1
APPETITE CHANGE: 1
FACIAL SWELLING: 1
FEVER: 1

## 2024-06-10 ENCOUNTER — OFFICE VISIT (OUTPATIENT)
Dept: SURGICAL ONCOLOGY | Facility: CLINIC | Age: 69
End: 2024-06-10
Payer: MEDICARE

## 2024-06-10 VITALS
WEIGHT: 166.78 LBS | SYSTOLIC BLOOD PRESSURE: 120 MMHG | HEART RATE: 80 BPM | BODY MASS INDEX: 27.33 KG/M2 | TEMPERATURE: 98.1 F | OXYGEN SATURATION: 100 % | DIASTOLIC BLOOD PRESSURE: 82 MMHG

## 2024-06-10 DIAGNOSIS — Z80.3 FAMILY HISTORY OF BREAST CANCER: Primary | ICD-10-CM

## 2024-06-10 DIAGNOSIS — Z12.31 SCREENING MAMMOGRAM FOR BREAST CANCER: ICD-10-CM

## 2024-06-10 PROCEDURE — 1159F MED LIST DOCD IN RCRD: CPT | Performed by: NURSE PRACTITIONER

## 2024-06-10 PROCEDURE — 99214 OFFICE O/P EST MOD 30 MIN: CPT | Performed by: NURSE PRACTITIONER

## 2024-06-10 PROCEDURE — 1126F AMNT PAIN NOTED NONE PRSNT: CPT | Performed by: NURSE PRACTITIONER

## 2024-06-10 PROCEDURE — 3079F DIAST BP 80-89 MM HG: CPT | Performed by: NURSE PRACTITIONER

## 2024-06-10 PROCEDURE — 3074F SYST BP LT 130 MM HG: CPT | Performed by: NURSE PRACTITIONER

## 2024-06-10 ASSESSMENT — PAIN SCALES - GENERAL: PAINLEVEL: 0-NO PAIN

## 2024-06-10 NOTE — PATIENT INSTRUCTIONS
Your clinical examination is normal. You no longer need to be seen by a breast specialist for an annual physical breast examination. It is important to continue annual screening mammograms and breast exams through your primary care provider. Please return to see me if you have a new breast problem or abnormal mammogram. It has been a pleasure having you as a patient.     You can see your health information, review clinical summaries from office visits & test results online when you follow your health with MY  Chart, a personal health record. To sign up go to [email or web address]. If you need assistance with signing up or trouble getting into your account call [facility] Patient Line 24/7 at [phone].    My office phone number is [phone] if you need to get in touch with me or have additional questions or concerns. Thank you for choosing [facility] and trusting me as your healthcare provider. I am honored to be a provider on your health care team and I remain dedicated to helping you achieve your health goals.

## 2024-06-29 PROBLEM — J01.00 SUBACUTE MAXILLARY SINUSITIS: Status: RESOLVED | Noted: 2024-06-03 | Resolved: 2024-06-29

## 2024-06-29 PROBLEM — R00.2 PALPITATIONS: Status: ACTIVE | Noted: 2024-06-29

## 2024-06-29 PROBLEM — R73.9 HYPERGLYCEMIA: Status: RESOLVED | Noted: 2023-12-13 | Resolved: 2024-06-29

## 2024-06-29 PROBLEM — E66.9 OBESITY (BMI 30-39.9): Status: RESOLVED | Noted: 2023-12-13 | Resolved: 2024-06-29

## 2024-06-29 PROBLEM — Z78.0 POSTMENOPAUSAL: Status: ACTIVE | Noted: 2024-06-29

## 2024-06-29 ASSESSMENT — ENCOUNTER SYMPTOMS
FREQUENCY: 0
ACTIVITY CHANGE: 1
DYSURIA: 0

## 2024-08-01 ENCOUNTER — HOSPITAL ENCOUNTER (OUTPATIENT)
Dept: RADIOLOGY | Facility: CLINIC | Age: 69
Discharge: HOME | End: 2024-08-01
Payer: MEDICARE

## 2024-08-01 VITALS — BODY MASS INDEX: 26.66 KG/M2 | WEIGHT: 160 LBS | HEIGHT: 65 IN

## 2024-08-01 DIAGNOSIS — Z12.31 SCREENING MAMMOGRAM FOR BREAST CANCER: ICD-10-CM

## 2024-08-01 PROCEDURE — 77067 SCR MAMMO BI INCL CAD: CPT

## 2024-10-29 ENCOUNTER — OFFICE VISIT (OUTPATIENT)
Dept: PRIMARY CARE | Facility: CLINIC | Age: 69
End: 2024-10-29
Payer: MEDICARE

## 2024-10-29 VITALS
HEART RATE: 89 BPM | DIASTOLIC BLOOD PRESSURE: 81 MMHG | TEMPERATURE: 97.6 F | WEIGHT: 162.2 LBS | BODY MASS INDEX: 26.99 KG/M2 | OXYGEN SATURATION: 98 % | SYSTOLIC BLOOD PRESSURE: 121 MMHG

## 2024-10-29 DIAGNOSIS — R79.9 ABNORMAL FINDING OF BLOOD CHEMISTRY, UNSPECIFIED: ICD-10-CM

## 2024-10-29 DIAGNOSIS — I10 PRIMARY HYPERTENSION: ICD-10-CM

## 2024-10-29 DIAGNOSIS — J01.10 ACUTE NON-RECURRENT FRONTAL SINUSITIS: Primary | ICD-10-CM

## 2024-10-29 DIAGNOSIS — K45.8 RECURRENT ABDOMINAL HERNIA WITHOUT OBSTRUCTION OR GANGRENE, UNSPECIFIED HERNIA TYPE: ICD-10-CM

## 2024-10-29 PROCEDURE — 1160F RVW MEDS BY RX/DR IN RCRD: CPT | Performed by: STUDENT IN AN ORGANIZED HEALTH CARE EDUCATION/TRAINING PROGRAM

## 2024-10-29 PROCEDURE — 1036F TOBACCO NON-USER: CPT | Performed by: STUDENT IN AN ORGANIZED HEALTH CARE EDUCATION/TRAINING PROGRAM

## 2024-10-29 PROCEDURE — 3074F SYST BP LT 130 MM HG: CPT | Performed by: STUDENT IN AN ORGANIZED HEALTH CARE EDUCATION/TRAINING PROGRAM

## 2024-10-29 PROCEDURE — 1159F MED LIST DOCD IN RCRD: CPT | Performed by: STUDENT IN AN ORGANIZED HEALTH CARE EDUCATION/TRAINING PROGRAM

## 2024-10-29 PROCEDURE — 3079F DIAST BP 80-89 MM HG: CPT | Performed by: STUDENT IN AN ORGANIZED HEALTH CARE EDUCATION/TRAINING PROGRAM

## 2024-10-29 PROCEDURE — 99213 OFFICE O/P EST LOW 20 MIN: CPT | Performed by: STUDENT IN AN ORGANIZED HEALTH CARE EDUCATION/TRAINING PROGRAM

## 2024-10-29 RX ORDER — AMOXICILLIN 875 MG/1
875 TABLET, FILM COATED ORAL 2 TIMES DAILY
Qty: 20 TABLET | Refills: 0 | Status: SHIPPED | OUTPATIENT
Start: 2024-10-29 | End: 2024-11-08

## 2024-10-29 RX ORDER — BENZONATATE 200 MG/1
200 CAPSULE ORAL 3 TIMES DAILY PRN
Qty: 42 CAPSULE | Refills: 0 | Status: SHIPPED | OUTPATIENT
Start: 2024-10-29 | End: 2024-11-28

## 2024-10-29 RX ORDER — FLUTICASONE PROPIONATE 50 MCG
1 SPRAY, SUSPENSION (ML) NASAL DAILY
Qty: 16 G | Refills: 0 | Status: SHIPPED | OUTPATIENT
Start: 2024-10-29 | End: 2024-11-12

## 2024-10-29 ASSESSMENT — ENCOUNTER SYMPTOMS
CHILLS: 0
SINUS PAIN: 1
HEADACHES: 1
SWEATS: 0
FEVER: 0
MYALGIAS: 0
HEMOPTYSIS: 1
SORE THROAT: 1
SINUS PRESSURE: 1
COUGH: 1

## 2024-11-18 ENCOUNTER — APPOINTMENT (OUTPATIENT)
Dept: UROLOGY | Facility: CLINIC | Age: 69
End: 2024-11-18
Payer: MEDICARE

## 2024-11-25 ENCOUNTER — APPOINTMENT (OUTPATIENT)
Dept: SURGERY | Facility: CLINIC | Age: 69
End: 2024-11-25
Payer: MEDICARE

## 2024-11-25 VITALS
SYSTOLIC BLOOD PRESSURE: 114 MMHG | DIASTOLIC BLOOD PRESSURE: 80 MMHG | RESPIRATION RATE: 17 BRPM | HEART RATE: 88 BPM | HEIGHT: 65 IN | OXYGEN SATURATION: 97 % | WEIGHT: 162 LBS | TEMPERATURE: 98.2 F | BODY MASS INDEX: 26.99 KG/M2

## 2024-11-25 DIAGNOSIS — R10.9 ABDOMINAL WALL PAIN: Primary | ICD-10-CM

## 2024-11-25 DIAGNOSIS — K45.8 RECURRENT ABDOMINAL HERNIA WITHOUT OBSTRUCTION OR GANGRENE, UNSPECIFIED HERNIA TYPE: ICD-10-CM

## 2024-11-25 PROCEDURE — 3079F DIAST BP 80-89 MM HG: CPT | Performed by: SURGERY

## 2024-11-25 PROCEDURE — 3008F BODY MASS INDEX DOCD: CPT | Performed by: SURGERY

## 2024-11-25 PROCEDURE — 99203 OFFICE O/P NEW LOW 30 MIN: CPT | Performed by: SURGERY

## 2024-11-25 PROCEDURE — 1159F MED LIST DOCD IN RCRD: CPT | Performed by: SURGERY

## 2024-11-25 PROCEDURE — 3074F SYST BP LT 130 MM HG: CPT | Performed by: SURGERY

## 2024-11-25 PROCEDURE — 1036F TOBACCO NON-USER: CPT | Performed by: SURGERY

## 2024-11-25 NOTE — PROGRESS NOTES
History Of Present Illness :  Janie Zhong is a 69 y.o. female who presents referral from Chris Kim MD for abdominal wall evaluation.  The patient was recently seen by Dr. Kim on 10/29/2024, and that note was reviewed.    The patient has had multiple previous abdominal operations.  More than 20 years ago she had an urgent open TONY/BSO through a lower midline incision.  Thereafter, about 17 years ago, she developed a incisional hernia and had repair with mesh.  The mesh became infected, this was ultimately excised by a plastic surgeon, with a primary repair, and a panniculectomy.    For about 2 years, the patient is noted a fullness or soft tissue mass in the right upper quadrant along the abdominal wall.  She thinks this has become more prominent.  This waxes and wanes in size.  This is more prominent with exercise and activity.  Occasionally she has a stabbing sensation associated with the small lump.  She denies any GI symptoms.    Patient is otherwise in good health.  She is  and lives in [place].  She is retired from [employer] where she was  of the [employer] and sciences.        Past Medical History   Past Medical History:   Diagnosis Date    Abnormal levels of other serum enzymes 04/16/2019    Abnormal liver enzymes    Contact dermatitis due to poison ivy 08/07/2023    Cough, unspecified 11/14/2014    Cough    Cough, unspecified 05/21/2014    Cough    Essential (primary) hypertension 04/02/2013    Benign essential hypertension    Hirsutism     Hirsutism    Hyperglycemia 12/13/2023    Melena     Blood in stool    Other conditions influencing health status 07/09/2018    History of cough    Personal history of diseases of the skin and subcutaneous tissue     History of alopecia    Personal history of other diseases of the nervous system and sense organs     History of migraine    Personal history of other diseases of the respiratory system  11/14/2014    History of upper respiratory infection    Personal history of other diseases of the respiratory system 07/09/2018    History of acute bronchitis    Personal history of other diseases of the respiratory system 09/22/2017    History of bronchitis    Poison ivy dermatitis 08/07/2023    Subacute maxillary sinusitis 06/03/2024        Surgical History    Past Surgical History:   Procedure Laterality Date    FETAL SURGERY FOR CONGENITAL HERNIA      x 2    TOTAL ABDOMINAL HYSTERECTOMY W/ BILATERAL SALPINGOOPHORECTOMY  02/27/2014    Total Abdominal Hysterectomy With Removal Of Both Ovaries        Allergies   Allergies   Allergen Reactions    Verapamil Other        Home Meds  Current Outpatient Medications   Medication Instructions    aspirin 81 mg EC tablet Once    benzonatate (TESSALON) 200 mg, oral, 3 times daily PRN, Do not crush or chew.    cholecalciferol (Vitamin D-3) 125 MCG (5000 UT) capsule 1 capsule, Daily    estradiol (Estrace) 0.01 % (0.1 mg/gram) vaginal cream USE A PEA SIZED AMOUNT IN THE VAGINA TWICE WEEKLY    fluticasone (Flonase) 50 mcg/actuation nasal spray 1 spray, Each Nostril, Daily, Shake gently. Before first use, prime pump. After use, clean tip and replace cap.    hydrocortisone (ANUSOL-HC) 25 mg, rectal, 2 times daily PRN    ibuprofen 800 mg tablet 1 tablet, 3 times daily PRN    losartan-hydrochlorothiazide (Hyzaar) 100-12.5 mg tablet 1 tablet, oral, Daily    ondansetron ODT (Zofran-ODT) 4 mg disintegrating tablet 3 times daily PRN    rizatriptan (Maxalt) 10 mg tablet Once as needed    SUMAtriptan (Imitrex) 20 mg/actuation nasal spray As needed        Family History    Family History   Problem Relation Name Age of Onset    Lung cancer Mother      COPD Father      Emphysema Father      Lupus Sister      Lung cancer Brother      No Known Problems Son      Breast cancer Maternal Grandmother      Coronary artery disease Maternal Grandfather      Breast cancer Mother's Sister  65    Coronary  artery disease Mother's Sister      Breast cancer Mother's Sister  70    Coronary artery disease Mother's Brother          Social History  Social History     Tobacco Use    Smoking status: Never    Smokeless tobacco: Never   Vaping Use    Vaping status: Never Used   Substance Use Topics    Alcohol use: Yes     Comment: rarely    Drug use: Never        Review Of Systems    Review of Systems    General: Not Present- Obesity, Cancer, HIV, MRSA, Recent Cold/Flu, Tired During the Day and VRE.  HEENT: Not Present- Migraine, Cataracts, Glaucoma, Macular Degeneration and Retinal Detachment.  Respiratory: Not Present- Asthma, Chronic Cough, Difficulty Breathing on Exertion, Difficulty Breathing at Rest, Emphysema, Frequent Bronchitis, Home CPAP/BiPAP, Home Oxygen, Pulmonary Embolus, Pneumonia/TB, Sleep Apnea and Snoring.  Cardiovascular: Not Present- Chest Pain, Congestive Heart Failure, Heart Attack, Coronary Artery Disease, Heart Stent, High Cholesterol/Lipids, Internal Defibrillator, Irregular Heart Beat, Mitral Valve Prolapse, Murmur, Pacemaker and Peripheral Vascular Disease.  Gastrointestinal: Not Present- Heartburn, Hepatitis, Hiatal Hernia, Jaundice, Reflux, Stomach Ulcer and IBS.  Female Genitourinary: Not Present- Kidney Failure, Kidney Stones, Dialysis and Urinary Tract Infection.  Musculoskeletal: Not Present- Arthritis, Back Pain and Fibromyalgia.  Neurological: Not Present-  Numbness, Tingling, Seizures, Stroke,  Shunt and Weakness.  Psychiatric: Not Present- Anxiety, Bipolar, Depression and Panic Attacks.  Endocrine: Not Present- Diabetes, Hyperthyroidism, Hypothyroidism and Low Blood Sugar.  Hematology: Not Present- Abnormal Bleeding, Anemia and Blood Clots.    Vitals  There were no vitals taken for this visit.     Physical Exam   Abdominal / Ano-Rectal Physical Exam    General  General Appearance - Not in acute distress.  Well-developed and well-nourished.  Alert and oriented times 3.    Chest and Lung  Exam  Auscultation:  Breath sounds: - Normal.  Clear and equal bilaterally.  Adventitious sounds: - No Adventitious sounds.    Cardiovascular  Auscultation: Rate and Rhythm - Regular. Heart Sounds - Normal heart sounds.  No murmurs.    Abdomen  Inspection: Inspection of the abdomen reveals - No Visible peristalsis, No Abnormal pulsations, No Paradoxical  movements and No Hernias.  Palpation/Percussion: Palpation and Percussion of the abdomen reveal - Non Tender, No Rebound tenderness, NoRigidity (guarding) and No Palpable abdominal masses.  Liver: - Normal.  Spleen: - Normal.  Auscultation: Auscultation of the abdomen reveals - Bowel sounds normal and No Abdominal bruits.  Surgical scars: Circumareolar umbilical, and the lower transverse  Examined supine and standing, with and without Valsalva, the abdominal wall is completely intact and there is no evidence of abdominal wall hernia or fascial defect.  5 cm inferior to the costal margin on the right side, midclavicular line, there is a very small 8 mm subcutaneous lobule.  This is not a lipoma.    Inguinal and External Genitalia    The patient was examined supine, and standing, with and without Valsalva. There is no evidence of inguinal or femoral hernia or lymphadenopathy bilaterally.   Rectal  Anorectal Exam:  not examined.      Assessment/Plan   Mrs. Zhong has no evidence of abdominal wall, inguinal, or femoral hernia bilaterally.  Her abdominal wall is strong and intact.    Recommendations:    No activity or physical restrictions whatsoever    No indication for imaging studies    Patient will follow-up with me in 6 months for recheck

## 2024-12-06 ENCOUNTER — LAB (OUTPATIENT)
Dept: LAB | Facility: LAB | Age: 69
End: 2024-12-06
Payer: MEDICARE

## 2024-12-06 DIAGNOSIS — I10 PRIMARY HYPERTENSION: ICD-10-CM

## 2024-12-06 DIAGNOSIS — R79.9 ABNORMAL FINDING OF BLOOD CHEMISTRY, UNSPECIFIED: ICD-10-CM

## 2024-12-06 LAB
ALBUMIN SERPL BCP-MCNC: 4.3 G/DL (ref 3.4–5)
ALP SERPL-CCNC: 97 U/L (ref 33–136)
ALT SERPL W P-5'-P-CCNC: 22 U/L (ref 7–45)
ANION GAP SERPL CALC-SCNC: 11 MMOL/L (ref 10–20)
AST SERPL W P-5'-P-CCNC: 21 U/L (ref 9–39)
BILIRUB SERPL-MCNC: 0.5 MG/DL (ref 0–1.2)
BUN SERPL-MCNC: 15 MG/DL (ref 6–23)
CALCIUM SERPL-MCNC: 9.7 MG/DL (ref 8.6–10.3)
CHLORIDE SERPL-SCNC: 103 MMOL/L (ref 98–107)
CHOLEST SERPL-MCNC: 237 MG/DL (ref 0–199)
CHOLESTEROL/HDL RATIO: 3.7
CO2 SERPL-SCNC: 28 MMOL/L (ref 21–32)
CREAT SERPL-MCNC: 0.66 MG/DL (ref 0.5–1.05)
EGFRCR SERPLBLD CKD-EPI 2021: >90 ML/MIN/1.73M*2
ERYTHROCYTE [DISTWIDTH] IN BLOOD BY AUTOMATED COUNT: 14.1 % (ref 11.5–14.5)
EST. AVERAGE GLUCOSE BLD GHB EST-MCNC: 105 MG/DL
GLUCOSE SERPL-MCNC: 84 MG/DL (ref 74–99)
HBA1C MFR BLD: 5.3 %
HCT VFR BLD AUTO: 47.1 % (ref 36–46)
HDLC SERPL-MCNC: 64.4 MG/DL
HGB BLD-MCNC: 15.1 G/DL (ref 12–16)
LDLC SERPL CALC-MCNC: 137 MG/DL
MCH RBC QN AUTO: 30.1 PG (ref 26–34)
MCHC RBC AUTO-ENTMCNC: 32.1 G/DL (ref 32–36)
MCV RBC AUTO: 94 FL (ref 80–100)
NON HDL CHOLESTEROL: 173 MG/DL (ref 0–149)
NRBC BLD-RTO: 0 /100 WBCS (ref 0–0)
PLATELET # BLD AUTO: 204 X10*3/UL (ref 150–450)
POTASSIUM SERPL-SCNC: 4.3 MMOL/L (ref 3.5–5.3)
PROT SERPL-MCNC: 7.2 G/DL (ref 6.4–8.2)
RBC # BLD AUTO: 5.01 X10*6/UL (ref 4–5.2)
SODIUM SERPL-SCNC: 138 MMOL/L (ref 136–145)
TRIGL SERPL-MCNC: 177 MG/DL (ref 0–149)
VLDL: 35 MG/DL (ref 0–40)
WBC # BLD AUTO: 5.9 X10*3/UL (ref 4.4–11.3)

## 2024-12-06 PROCEDURE — 80053 COMPREHEN METABOLIC PANEL: CPT

## 2024-12-06 PROCEDURE — 82306 VITAMIN D 25 HYDROXY: CPT

## 2024-12-06 PROCEDURE — 85027 COMPLETE CBC AUTOMATED: CPT

## 2024-12-06 PROCEDURE — 82607 VITAMIN B-12: CPT

## 2024-12-06 PROCEDURE — 36415 COLL VENOUS BLD VENIPUNCTURE: CPT

## 2024-12-06 PROCEDURE — 80061 LIPID PANEL: CPT

## 2024-12-06 PROCEDURE — 83036 HEMOGLOBIN GLYCOSYLATED A1C: CPT

## 2024-12-06 PROCEDURE — 84443 ASSAY THYROID STIM HORMONE: CPT

## 2024-12-10 ENCOUNTER — APPOINTMENT (OUTPATIENT)
Dept: PRIMARY CARE | Facility: CLINIC | Age: 69
End: 2024-12-10
Payer: MEDICARE

## 2024-12-10 ENCOUNTER — HOSPITAL ENCOUNTER (OUTPATIENT)
Dept: RADIOLOGY | Facility: CLINIC | Age: 69
Discharge: HOME | End: 2024-12-10
Payer: MEDICARE

## 2024-12-10 VITALS
OXYGEN SATURATION: 96 % | HEIGHT: 64 IN | TEMPERATURE: 97.6 F | SYSTOLIC BLOOD PRESSURE: 109 MMHG | DIASTOLIC BLOOD PRESSURE: 76 MMHG | WEIGHT: 164.2 LBS | HEART RATE: 79 BPM | BODY MASS INDEX: 28.03 KG/M2

## 2024-12-10 DIAGNOSIS — G43.E09 CHRONIC MIGRAINE WITH AURA WITHOUT STATUS MIGRAINOSUS, NOT INTRACTABLE: ICD-10-CM

## 2024-12-10 DIAGNOSIS — R53.82 CHRONIC FATIGUE: ICD-10-CM

## 2024-12-10 DIAGNOSIS — Z00.00 ROUTINE GENERAL MEDICAL EXAMINATION AT HEALTH CARE FACILITY: Primary | ICD-10-CM

## 2024-12-10 DIAGNOSIS — M25.562 BILATERAL CHRONIC KNEE PAIN: ICD-10-CM

## 2024-12-10 DIAGNOSIS — K64.9 HEMORRHOIDS, UNSPECIFIED HEMORRHOID TYPE: ICD-10-CM

## 2024-12-10 DIAGNOSIS — E53.8 B12 DEFICIENCY: ICD-10-CM

## 2024-12-10 DIAGNOSIS — G89.29 BILATERAL CHRONIC KNEE PAIN: ICD-10-CM

## 2024-12-10 DIAGNOSIS — M25.561 BILATERAL CHRONIC KNEE PAIN: ICD-10-CM

## 2024-12-10 DIAGNOSIS — E55.9 VITAMIN D DEFICIENCY: ICD-10-CM

## 2024-12-10 DIAGNOSIS — R63.5 WEIGHT GAIN: ICD-10-CM

## 2024-12-10 LAB
25(OH)D3 SERPL-MCNC: 37 NG/ML (ref 30–100)
TSH SERPL-ACNC: 1.56 MIU/L (ref 0.44–3.98)
VIT B12 SERPL-MCNC: 643 PG/ML (ref 211–911)

## 2024-12-10 PROCEDURE — 1159F MED LIST DOCD IN RCRD: CPT | Performed by: STUDENT IN AN ORGANIZED HEALTH CARE EDUCATION/TRAINING PROGRAM

## 2024-12-10 PROCEDURE — 73562 X-RAY EXAM OF KNEE 3: CPT | Mod: BILATERAL PROCEDURE | Performed by: RADIOLOGY

## 2024-12-10 PROCEDURE — 3074F SYST BP LT 130 MM HG: CPT | Performed by: STUDENT IN AN ORGANIZED HEALTH CARE EDUCATION/TRAINING PROGRAM

## 2024-12-10 PROCEDURE — G0439 PPPS, SUBSEQ VISIT: HCPCS | Performed by: STUDENT IN AN ORGANIZED HEALTH CARE EDUCATION/TRAINING PROGRAM

## 2024-12-10 PROCEDURE — 1160F RVW MEDS BY RX/DR IN RCRD: CPT | Performed by: STUDENT IN AN ORGANIZED HEALTH CARE EDUCATION/TRAINING PROGRAM

## 2024-12-10 PROCEDURE — 3078F DIAST BP <80 MM HG: CPT | Performed by: STUDENT IN AN ORGANIZED HEALTH CARE EDUCATION/TRAINING PROGRAM

## 2024-12-10 PROCEDURE — 3008F BODY MASS INDEX DOCD: CPT | Performed by: STUDENT IN AN ORGANIZED HEALTH CARE EDUCATION/TRAINING PROGRAM

## 2024-12-10 PROCEDURE — 1124F ACP DISCUSS-NO DSCNMKR DOCD: CPT | Performed by: STUDENT IN AN ORGANIZED HEALTH CARE EDUCATION/TRAINING PROGRAM

## 2024-12-10 PROCEDURE — 73562 X-RAY EXAM OF KNEE 3: CPT | Mod: 50

## 2024-12-10 PROCEDURE — 1170F FXNL STATUS ASSESSED: CPT | Performed by: STUDENT IN AN ORGANIZED HEALTH CARE EDUCATION/TRAINING PROGRAM

## 2024-12-10 PROCEDURE — 1036F TOBACCO NON-USER: CPT | Performed by: STUDENT IN AN ORGANIZED HEALTH CARE EDUCATION/TRAINING PROGRAM

## 2024-12-10 RX ORDER — IBUPROFEN 800 MG/1
800 TABLET ORAL 3 TIMES DAILY PRN
Qty: 30 TABLET | Refills: 11 | Status: SHIPPED | OUTPATIENT
Start: 2024-12-10

## 2024-12-10 RX ORDER — HYDROCORTISONE ACETATE 25 MG/1
25 SUPPOSITORY RECTAL 2 TIMES DAILY PRN
Qty: 24 SUPPOSITORY | Refills: 1 | Status: SHIPPED | OUTPATIENT
Start: 2024-12-10

## 2024-12-10 ASSESSMENT — ENCOUNTER SYMPTOMS
COUGH: 0
SHORTNESS OF BREATH: 0
DIZZINESS: 0
BLOOD IN STOOL: 0
ABDOMINAL PAIN: 0
CHILLS: 0
VOMITING: 0
DYSURIA: 0
CONSTIPATION: 0
LIGHT-HEADEDNESS: 0
RHINORRHEA: 0
DYSPHORIC MOOD: 0
NAUSEA: 0
PALPITATIONS: 0
NERVOUS/ANXIOUS: 0
DIARRHEA: 0
HEMATURIA: 0
FATIGUE: 1
ARTHRALGIAS: 0
UNEXPECTED WEIGHT CHANGE: 0
HEADACHES: 0
FEVER: 0

## 2024-12-10 ASSESSMENT — PATIENT HEALTH QUESTIONNAIRE - PHQ9
2. FEELING DOWN, DEPRESSED OR HOPELESS: NOT AT ALL
1. LITTLE INTEREST OR PLEASURE IN DOING THINGS: NOT AT ALL
SUM OF ALL RESPONSES TO PHQ9 QUESTIONS 1 & 2: 0
SUM OF ALL RESPONSES TO PHQ9 QUESTIONS 1 AND 2: 0
2. FEELING DOWN, DEPRESSED OR HOPELESS: NOT AT ALL
1. LITTLE INTEREST OR PLEASURE IN DOING THINGS: NOT AT ALL

## 2024-12-10 ASSESSMENT — ACTIVITIES OF DAILY LIVING (ADL)
DOING_HOUSEWORK: INDEPENDENT
TAKING_MEDICATION: INDEPENDENT
BATHING: INDEPENDENT
DRESSING: INDEPENDENT
GROCERY_SHOPPING: INDEPENDENT
MANAGING_FINANCES: INDEPENDENT

## 2024-12-10 NOTE — PROGRESS NOTES
"Subjective   Reason for Visit: Janie Zhong is an 69 y.o. female here for a Medicare Wellness visit.     Past Medical, Surgical, and Family History reviewed and updated in chart.    Reviewed all medications by prescribing practitioner or clinical pharmacist (such as prescriptions, OTCs, herbal therapies and supplements) and documented in the medical record.    HPI  Discussed statin due to elevated ASCVD, patient declined statin therapy.   Patient Care Team:  Chris Kim MD as PCP - General (Internal Medicine)  Nain Mendoza MD as Surgeon (General Surgery)     Review of Systems   Constitutional:  Positive for fatigue. Negative for chills, fever and unexpected weight change.   HENT:  Negative for congestion and rhinorrhea.    Eyes:  Negative for visual disturbance.   Respiratory:  Negative for cough and shortness of breath.    Cardiovascular:  Negative for chest pain, palpitations and leg swelling.   Gastrointestinal:  Negative for abdominal pain, blood in stool, constipation, diarrhea, nausea and vomiting.   Genitourinary:  Negative for dysuria and hematuria.   Musculoskeletal:  Negative for arthralgias and gait problem.   Skin:  Negative for rash.   Neurological:  Negative for dizziness, light-headedness and headaches.   Psychiatric/Behavioral:  Negative for dysphoric mood. The patient is not nervous/anxious.        Objective   Vitals:  /76 (BP Location: Left arm, Patient Position: Sitting, BP Cuff Size: Adult)   Pulse 79   Temp 36.4 °C (97.6 °F) (Temporal)   Ht 1.626 m (5' 4\")   Wt 74.5 kg (164 lb 3.2 oz)   SpO2 96%   BMI 28.18 kg/m²       Physical Exam  Constitutional:       General: She is not in acute distress.     Appearance: Normal appearance. She is not ill-appearing.   HENT:      Head: Normocephalic and atraumatic.      Right Ear: Tympanic membrane, ear canal and external ear normal. There is no impacted cerumen.      Left Ear: Tympanic membrane, ear canal and external ear normal. " There is no impacted cerumen.      Mouth/Throat:      Mouth: Mucous membranes are moist.      Pharynx: Oropharynx is clear. No oropharyngeal exudate or posterior oropharyngeal erythema.   Eyes:      General:         Right eye: No discharge.         Left eye: No discharge.      Extraocular Movements: Extraocular movements intact.      Conjunctiva/sclera: Conjunctivae normal.      Pupils: Pupils are equal, round, and reactive to light.   Cardiovascular:      Rate and Rhythm: Normal rate and regular rhythm.      Pulses: Normal pulses.      Heart sounds: Normal heart sounds. No murmur heard.     No friction rub. No gallop.   Pulmonary:      Effort: Pulmonary effort is normal. No respiratory distress.      Breath sounds: Normal breath sounds. No stridor. No wheezing, rhonchi or rales.   Abdominal:      General: Abdomen is flat. Bowel sounds are normal. There is no distension.      Palpations: Abdomen is soft.      Tenderness: There is no abdominal tenderness. There is no guarding.   Musculoskeletal:      Right lower leg: No edema.      Left lower leg: No edema.   Skin:     General: Skin is warm and dry.      Capillary Refill: Capillary refill takes less than 2 seconds.   Neurological:      General: No focal deficit present.      Mental Status: She is alert.   Psychiatric:         Mood and Affect: Mood normal.         Behavior: Behavior normal.         Thought Content: Thought content normal.         Judgment: Judgment normal.         Assessment & Plan  Routine general medical examination at health care facility    Orders:    1 Year Follow Up In Primary Care - Wellness Exam; Future    1 Year Follow Up In Primary Care - Wellness Exam; Future    B12 deficiency    Orders:    Vitamin B12; Future    Vitamin D deficiency    Orders:    Vitamin D 25-Hydroxy,Total (for eval of Vitamin D levels); Future    Chronic fatigue    Orders:    Tsh With Reflex To Free T4 If Abnormal; Future    Weight gain    Orders:    Referral to Clinical  Pharmacy; Future    Hemorrhoids, unspecified hemorrhoid type    Orders:    hydrocortisone (Anusol-HC) 25 mg suppository; Insert 1 suppository (25 mg) into the rectum 2 times a day as needed for hemorrhoids.    Chronic migraine with aura without status migrainosus, not intractable    Orders:    ibuprofen 800 mg tablet; Take 1 tablet (800 mg) by mouth 3 times a day as needed for moderate pain (4 - 6).    Bilateral chronic knee pain    Orders:    XR knee 4+ views bilateral; Future

## 2024-12-10 NOTE — ASSESSMENT & PLAN NOTE
Orders:    hydrocortisone (Anusol-HC) 25 mg suppository; Insert 1 suppository (25 mg) into the rectum 2 times a day as needed for hemorrhoids.

## 2024-12-12 ENCOUNTER — TELEPHONE (OUTPATIENT)
Dept: PRIMARY CARE | Facility: CLINIC | Age: 69
End: 2024-12-12
Payer: MEDICARE

## 2024-12-12 DIAGNOSIS — M25.562 LEFT KNEE PAIN, UNSPECIFIED CHRONICITY: Primary | ICD-10-CM

## 2024-12-12 NOTE — TELEPHONE ENCOUNTER
----- Message from Chris Kim sent at 12/10/2024  1:14 PM EST -----  Degenerative changes of the knee can refer to orthopedics if patient is agreeable.

## 2024-12-12 NOTE — TELEPHONE ENCOUNTER
----- Message from Chris Kim sent at 12/10/2024  8:54 AM EST -----  Total cholesterol, LDL and triglycerides are elevated. Her risk of heart attack in stroke in next ten years is 7.5%, if anyone's risk is more than 5% it is recommended to start statin medication. If patient is agreeable we can start this medication or make appointment to discuss further

## 2024-12-12 NOTE — TELEPHONE ENCOUNTER
Pt informed of xray results    Would like to be referred to orthopedics.   Pended order for you to finish and sign.

## 2024-12-16 ENCOUNTER — APPOINTMENT (OUTPATIENT)
Dept: PRIMARY CARE | Facility: CLINIC | Age: 69
End: 2024-12-16
Payer: MEDICARE

## 2024-12-16 ENCOUNTER — TELEMEDICINE (OUTPATIENT)
Dept: PHARMACY | Facility: HOSPITAL | Age: 69
End: 2024-12-16
Payer: MEDICARE

## 2024-12-16 DIAGNOSIS — R63.5 WEIGHT GAIN: ICD-10-CM

## 2024-12-16 NOTE — PROGRESS NOTES
Clinical Pharmacy Appointment    Patient ID: Janie Zhong is a 69 y.o. female who presents for Weight Management.    Pt is here for First appointment.     Referring Provider: Chris Kim MD  PCP: Chris Kim MD   Last visit with PCP: 12/10/2024   Next visit with PCP: 6/10/2025      Subjective     HPI  WEIGHT MANAGEMENT  PMH significant for hypertension, hyperlipidemia, prediabetes, fatty liver, knee pain  Special needs/barriers to therapy: cost/coverage of GLP1s    BMI Readings from Last 1 Encounters:   12/10/24 28.18 kg/m²   Starting weight: 164 lbs. (12/10/2024)    Lab Results   Component Value Date    HGBA1C 5.3 12/06/2024    GLUCOSE 84 12/06/2024     Lifestyle  Diet: 2-3 meals/day plus snacks  Has worked with nutritionist/dietician? Yes  Physical Activity: limited due to knee pain  Alcohol Use: rarely    Non-Pharmacological Therapy  Weight loss techniques attempted:  Self-directed dieting: reduced calorie  Commercial weight loss program: Weight Watchers, FitPal    Pharmacological Therapy  Current Medications: none for weight loss    Previous Medications:   metformin - stopped due to inefficacy  compounded semaglutide -  stopped due to cost    Pertinent PMH Review:  PMH of Pancreatitis: No  PMH of Retinopathy: No  PMH of MTC: No     Preventative Care  ASCVD Risk:   The 10-year ASCVD risk score (Zoila DK, et al., 2019) is: 6.9%    Values used to calculate the score:      Age: 69 years      Sex: Female      Is Non- : No      Diabetic: No      Tobacco smoker: No      Systolic Blood Pressure: 109 mmHg      Is BP treated: Yes      HDL Cholesterol: 64.4 mg/dL      Total Cholesterol: 237 mg/dL  On Statin?: No      Medication System Management  Patient's preferred pharmacy: [facility] in [place], [place]  Adherence/Organization: no concerns identified  Affordability/Accessibility: cost/coverage issues with GLP1s  Insurance coverage of weight-loss medications? No  Wegovy not on  formulary  Zepbound plan/benefit exclusion  Eligible for copay cards/programs? No, government plan      Objective   Allergies   Allergen Reactions    Verapamil Other     Social History     Social History Narrative    Not on file      Medication Review  Current Outpatient Medications   Medication Instructions    aspirin 81 mg EC tablet Once    cholecalciferol (Vitamin D-3) 125 MCG (5000 UT) capsule 1 capsule, Daily    estradiol (Estrace) 0.01 % (0.1 mg/gram) vaginal cream USE A PEA SIZED AMOUNT IN THE VAGINA TWICE WEEKLY    fluticasone (Flonase) 50 mcg/actuation nasal spray 1 spray, Each Nostril, Daily, Shake gently. Before first use, prime pump. After use, clean tip and replace cap.    hydrocortisone (ANUSOL-HC) 25 mg, rectal, 2 times daily PRN    ibuprofen 800 mg, oral, 3 times daily PRN    losartan-hydrochlorothiazide (Hyzaar) 100-12.5 mg tablet 1 tablet, oral, Daily    ondansetron ODT (Zofran-ODT) 4 mg disintegrating tablet 3 times daily PRN    rizatriptan (Maxalt) 10 mg tablet Once as needed    SUMAtriptan (Imitrex) 20 mg/actuation nasal spray As needed      Vitals  BP Readings from Last 2 Encounters:   12/10/24 109/76   11/25/24 114/80     BMI Readings from Last 1 Encounters:   12/10/24 28.18 kg/m²      Labs  A1C  Lab Results   Component Value Date    HGBA1C 5.3 12/06/2024    HGBA1C 5.6 12/13/2023    HGBA1C 5.7 (A) 12/01/2022     BMP  Lab Results   Component Value Date    CALCIUM 9.7 12/06/2024     12/06/2024    K 4.3 12/06/2024    CO2 28 12/06/2024     12/06/2024    BUN 15 12/06/2024    CREATININE 0.66 12/06/2024    EGFR >90 12/06/2024     LFTs  Lab Results   Component Value Date    ALT 22 12/06/2024    AST 21 12/06/2024    ALKPHOS 97 12/06/2024    BILITOT 0.5 12/06/2024     FLP  Lab Results   Component Value Date    TRIG 177 (H) 12/06/2024    CHOL 237 (H) 12/06/2024    LDLF 150 (H) 12/01/2022    LDLCALC 137 (H) 12/06/2024    HDL 64.4 12/06/2024     Urine Microalbumin  No results found for:  "\"MICROALBCREA\"  Weight Management  Wt Readings from Last 3 Encounters:   12/10/24 74.5 kg (164 lb 3.2 oz)   11/25/24 73.5 kg (162 lb)   10/29/24 73.6 kg (162 lb 3.2 oz)      There is no height or weight on file to calculate BMI.     Assessment/Plan   Problem List Items Addressed This Visit    None  Visit Diagnoses       Weight gain                DISCUSSION/PLAN:  Patient with hypertension, hyperlipidemia, prediabetes, fatty liver, and knee pain. Recommend starting GLP1 for blood glucose regulation, cardio/renal benefits, and weight loss. Will submit prior authorization for Wegovy and follow up in one week.    START  GLP1 such as Wegovy, pending prior authorization determination    Monitoring and Education:  Counseled on GLP1 mechanism of action, benefits, side effects, and potential complications  Answered all patient questions      Continue all meds under the continuation of care with the referring provider and clinical pharmacy team.    Clinical Pharmacist follow-up: will follow up by phone in approximately one week with outcome of Wegovy prior authorization  Orders placed: none at this time    Thank you,   Bebe Jones, PharmD  Clinical Pharmacy Specialist, Primary Care   [phone]    Verbal consent to manage patient's drug therapy was obtained from the patient . Patient was informed she may decline to participate or withdraw from participation in pharmacy services at any time.    "

## 2024-12-16 NOTE — Clinical Note
FYI/no action needed: Wegovy prior authorization is likely going to be denied and options are currently limited with Medicare. Patient is frustrated.

## 2025-05-19 ENCOUNTER — APPOINTMENT (OUTPATIENT)
Dept: SURGERY | Facility: CLINIC | Age: 70
End: 2025-05-19
Payer: MEDICARE

## 2025-06-09 ENCOUNTER — APPOINTMENT (OUTPATIENT)
Dept: SURGERY | Facility: CLINIC | Age: 70
End: 2025-06-09
Payer: MEDICARE

## 2025-06-10 ENCOUNTER — APPOINTMENT (OUTPATIENT)
Dept: PRIMARY CARE | Facility: CLINIC | Age: 70
End: 2025-06-10
Payer: MEDICARE

## 2025-06-13 ENCOUNTER — APPOINTMENT (OUTPATIENT)
Dept: PRIMARY CARE | Facility: CLINIC | Age: 70
End: 2025-06-13
Payer: MEDICARE

## 2025-06-13 VITALS
OXYGEN SATURATION: 96 % | WEIGHT: 162.2 LBS | DIASTOLIC BLOOD PRESSURE: 72 MMHG | TEMPERATURE: 97.4 F | HEART RATE: 78 BPM | BODY MASS INDEX: 27.84 KG/M2 | SYSTOLIC BLOOD PRESSURE: 107 MMHG

## 2025-06-13 DIAGNOSIS — E78.2 MIXED HYPERLIPIDEMIA: Primary | ICD-10-CM

## 2025-06-13 DIAGNOSIS — I10 ESSENTIAL (PRIMARY) HYPERTENSION: ICD-10-CM

## 2025-06-13 DIAGNOSIS — Z12.31 ENCOUNTER FOR SCREENING MAMMOGRAM FOR MALIGNANT NEOPLASM OF BREAST: ICD-10-CM

## 2025-06-13 DIAGNOSIS — G43.E09 CHRONIC MIGRAINE WITH AURA WITHOUT STATUS MIGRAINOSUS, NOT INTRACTABLE: ICD-10-CM

## 2025-06-13 LAB
ANION GAP SERPL CALCULATED.4IONS-SCNC: 10 MMOL/L (CALC) (ref 7–17)
BUN SERPL-MCNC: 14 MG/DL (ref 7–25)
BUN/CREAT SERPL: NORMAL (CALC) (ref 6–22)
CALCIUM SERPL-MCNC: 10.1 MG/DL (ref 8.6–10.4)
CHLORIDE SERPL-SCNC: 102 MMOL/L (ref 98–110)
CO2 SERPL-SCNC: 26 MMOL/L (ref 20–32)
CREAT SERPL-MCNC: 0.65 MG/DL (ref 0.5–1.05)
EGFRCR SERPLBLD CKD-EPI 2021: 96 ML/MIN/1.73M2
GLUCOSE SERPL-MCNC: 79 MG/DL (ref 65–99)
POTASSIUM SERPL-SCNC: 3.9 MMOL/L (ref 3.5–5.3)
SODIUM SERPL-SCNC: 138 MMOL/L (ref 135–146)

## 2025-06-13 PROCEDURE — 1036F TOBACCO NON-USER: CPT | Performed by: STUDENT IN AN ORGANIZED HEALTH CARE EDUCATION/TRAINING PROGRAM

## 2025-06-13 PROCEDURE — 99214 OFFICE O/P EST MOD 30 MIN: CPT | Performed by: STUDENT IN AN ORGANIZED HEALTH CARE EDUCATION/TRAINING PROGRAM

## 2025-06-13 PROCEDURE — 1159F MED LIST DOCD IN RCRD: CPT | Performed by: STUDENT IN AN ORGANIZED HEALTH CARE EDUCATION/TRAINING PROGRAM

## 2025-06-13 PROCEDURE — 3078F DIAST BP <80 MM HG: CPT | Performed by: STUDENT IN AN ORGANIZED HEALTH CARE EDUCATION/TRAINING PROGRAM

## 2025-06-13 PROCEDURE — 1160F RVW MEDS BY RX/DR IN RCRD: CPT | Performed by: STUDENT IN AN ORGANIZED HEALTH CARE EDUCATION/TRAINING PROGRAM

## 2025-06-13 PROCEDURE — G2211 COMPLEX E/M VISIT ADD ON: HCPCS | Performed by: STUDENT IN AN ORGANIZED HEALTH CARE EDUCATION/TRAINING PROGRAM

## 2025-06-13 PROCEDURE — 3074F SYST BP LT 130 MM HG: CPT | Performed by: STUDENT IN AN ORGANIZED HEALTH CARE EDUCATION/TRAINING PROGRAM

## 2025-06-13 PROCEDURE — 1123F ACP DISCUSS/DSCN MKR DOCD: CPT | Performed by: STUDENT IN AN ORGANIZED HEALTH CARE EDUCATION/TRAINING PROGRAM

## 2025-06-13 RX ORDER — LOSARTAN POTASSIUM 100 MG/1
100 TABLET ORAL DAILY
Qty: 100 TABLET | Refills: 3 | Status: SHIPPED | OUTPATIENT
Start: 2025-06-13 | End: 2026-07-18

## 2025-06-13 RX ORDER — IBUPROFEN 800 MG/1
800 TABLET, FILM COATED ORAL 3 TIMES DAILY PRN
Qty: 30 TABLET | Refills: 11 | Status: SHIPPED | OUTPATIENT
Start: 2025-06-13

## 2025-06-13 ASSESSMENT — PATIENT HEALTH QUESTIONNAIRE - PHQ9
SUM OF ALL RESPONSES TO PHQ9 QUESTIONS 1 & 2: 0
1. LITTLE INTEREST OR PLEASURE IN DOING THINGS: NOT AT ALL
2. FEELING DOWN, DEPRESSED OR HOPELESS: NOT AT ALL

## 2025-06-13 NOTE — PROGRESS NOTES
Assessment/Plan   Assessment & Plan  Mixed hyperlipidemia    Orders:    CT cardiac scoring wo IV contrast; Future    Essential (primary) hypertension    Orders:    CT cardiac scoring wo IV contrast; Future    losartan (Cozaar) 100 mg tablet; Take 1 tablet (100 mg) by mouth once daily.    Basic metabolic panel; Future    Encounter for screening mammogram for malignant neoplasm of breast    Orders:    BI mammo bilateral screening tomosynthesis; Future    Chronic migraine with aura without status migrainosus, not intractable    Orders:    ibuprofen 800 mg tablet; Take 1 tablet (800 mg) by mouth 3 times a day as needed for moderate pain (4 - 6).        Subjective   Patient ID: Janie Zhong is a 70 y.o. female who presents for Follow-up (6 month follow up/would like to discuss getting repeat cholesterol before agreeing to statin ).  HPI     Reviewed last coronary calcium score in 2021 which was 0. Although ASCVD is 7.4% I believe it is acceptable with coronary calcium score to withhold statin at this time and re-check coronary calcium score in one year as well as lipid panel.       Objective   Physical Exam  Constitutional:       Appearance: Normal appearance.   HENT:      Head: Normocephalic and atraumatic.   Cardiovascular:      Rate and Rhythm: Normal rate and regular rhythm.   Neurological:      Mental Status: She is alert.             Chris Kim MD 06/13/25 11:27 AM

## 2025-06-13 NOTE — PATIENT INSTRUCTIONS
Yes, I have an order  If you have an order for a calcium score test, call [phone] or schedule your order online. Once the test is complete, results will be sent to your referring provider to read, provide your test results and discuss the next steps.

## 2025-06-16 ENCOUNTER — APPOINTMENT (OUTPATIENT)
Dept: OPHTHALMOLOGY | Facility: CLINIC | Age: 70
End: 2025-06-16
Payer: MEDICARE

## 2025-06-16 DIAGNOSIS — H52.13 MYOPIA OF BOTH EYES: ICD-10-CM

## 2025-06-16 DIAGNOSIS — H53.15 VISUAL DISTORTIONS OF SHAPE AND SIZE: ICD-10-CM

## 2025-06-16 DIAGNOSIS — H43.393 VITREOUS FLOATERS OF BOTH EYES: ICD-10-CM

## 2025-06-16 DIAGNOSIS — H25.13 NUCLEAR SCLEROTIC CATARACT OF BOTH EYES: ICD-10-CM

## 2025-06-16 DIAGNOSIS — H43.813 POSTERIOR VITREOUS DETACHMENT OF BOTH EYES: Primary | ICD-10-CM

## 2025-06-16 PROCEDURE — 92134 CPTRZ OPH DX IMG PST SGM RTA: CPT

## 2025-06-16 PROCEDURE — 92004 COMPRE OPH EXAM NEW PT 1/>: CPT

## 2025-06-16 ASSESSMENT — SLIT LAMP EXAM - LIDS
COMMENTS: NORMAL
COMMENTS: NORMAL

## 2025-06-16 ASSESSMENT — VISUAL ACUITY
METHOD: SNELLEN - LINEAR
OD_SC: 20/25
OS_SC: 20/20
OD_SC+: +2

## 2025-06-16 ASSESSMENT — EXTERNAL EXAM - RIGHT EYE: OD_EXAM: NORMAL

## 2025-06-16 ASSESSMENT — TONOMETRY
IOP_METHOD: GOLDMANN APPLANATION
OS_IOP_MMHG: 22
OD_IOP_MMHG: 16

## 2025-06-16 ASSESSMENT — EXTERNAL EXAM - LEFT EYE: OS_EXAM: NORMAL

## 2025-06-16 NOTE — PROGRESS NOTES
# Posterior Vitreous Detachment OU  History Myopia s/p Lasik OU ( 2017), ? Of prior pre-LASIK refraction  Today reporting longstanding history floaters (OD > OS), denies flashes,   (+) fam history retinal detachment (daughter)    OCT : 06/16/25   OD: Normal Foveal Contour & Retinal laminations, EZ line preserved, (-) IRF/subretinal fluid (SRF)  OS: Normal Foveal Contour & Retinal laminations, EZ line preserved, (-) IRF/subretinal fluid (SRF))    Plan:  No evidence of tears/holes on peripheral exam  Signs/symptoms of retinal tear/detachment were discussed, patient instructed to call immediately with any new symptoms  RTC retina PRN, will refer to optometry for refraction and patient can follow with optometry for annual dilated exam    #Nuclear Sclerosis OU  - combined nuclear/cortical cataract OU  - retains good vision, monitor      #Myopia OU  - patient would like new glasses  -will refer to optometry

## 2025-07-02 DIAGNOSIS — I10 PRIMARY HYPERTENSION: ICD-10-CM

## 2025-07-02 RX ORDER — LOSARTAN POTASSIUM AND HYDROCHLOROTHIAZIDE 12.5; 1 MG/1; MG/1
1 TABLET ORAL DAILY
Qty: 90 TABLET | Refills: 3 | Status: SHIPPED | OUTPATIENT
Start: 2025-07-02

## 2025-08-05 ENCOUNTER — HOSPITAL ENCOUNTER (OUTPATIENT)
Dept: RADIOLOGY | Facility: CLINIC | Age: 70
Discharge: HOME | End: 2025-08-05
Payer: MEDICARE

## 2025-08-05 VITALS — BODY MASS INDEX: 27.66 KG/M2 | WEIGHT: 162 LBS | HEIGHT: 64 IN

## 2025-08-05 DIAGNOSIS — Z12.31 ENCOUNTER FOR SCREENING MAMMOGRAM FOR MALIGNANT NEOPLASM OF BREAST: ICD-10-CM

## 2025-08-05 PROCEDURE — 77063 BREAST TOMOSYNTHESIS BI: CPT | Performed by: RADIOLOGY

## 2025-08-05 PROCEDURE — 77067 SCR MAMMO BI INCL CAD: CPT | Performed by: RADIOLOGY

## 2025-08-05 PROCEDURE — 77063 BREAST TOMOSYNTHESIS BI: CPT

## 2025-08-06 ENCOUNTER — RESULTS FOLLOW-UP (OUTPATIENT)
Dept: PRIMARY CARE | Facility: CLINIC | Age: 70
End: 2025-08-06
Payer: MEDICARE

## 2025-08-07 NOTE — TELEPHONE ENCOUNTER
----- Message from Chris Kim sent at 8/6/2025 11:19 AM EDT -----  Normal mammogram repeat one year    ----- Message -----  From: Interface, Radiology Results In  Sent: 8/6/2025  11:17 AM EDT  To: Chris Kim MD

## 2025-08-18 ENCOUNTER — APPOINTMENT (OUTPATIENT)
Dept: SURGERY | Facility: CLINIC | Age: 70
End: 2025-08-18
Payer: MEDICARE

## 2025-08-18 VITALS
DIASTOLIC BLOOD PRESSURE: 85 MMHG | RESPIRATION RATE: 17 BRPM | WEIGHT: 162 LBS | HEIGHT: 65 IN | BODY MASS INDEX: 26.99 KG/M2 | SYSTOLIC BLOOD PRESSURE: 123 MMHG | HEART RATE: 82 BPM | OXYGEN SATURATION: 95 % | TEMPERATURE: 98.2 F

## 2025-08-18 DIAGNOSIS — R10.9 ABDOMINAL WALL PAIN: Primary | ICD-10-CM

## 2025-08-18 DIAGNOSIS — K40.90 REDUCIBLE LEFT INGUINAL HERNIA: ICD-10-CM

## 2025-08-18 PROCEDURE — 3079F DIAST BP 80-89 MM HG: CPT | Performed by: SURGERY

## 2025-08-18 PROCEDURE — 99214 OFFICE O/P EST MOD 30 MIN: CPT | Performed by: SURGERY

## 2025-08-18 PROCEDURE — 3008F BODY MASS INDEX DOCD: CPT | Performed by: SURGERY

## 2025-08-18 PROCEDURE — 1036F TOBACCO NON-USER: CPT | Performed by: SURGERY

## 2025-08-18 PROCEDURE — 1159F MED LIST DOCD IN RCRD: CPT | Performed by: SURGERY

## 2025-08-18 PROCEDURE — 3074F SYST BP LT 130 MM HG: CPT | Performed by: SURGERY

## 2025-08-18 RX ORDER — UREA 40 %
CREAM (GRAM) TOPICAL 2 TIMES DAILY
COMMUNITY
Start: 2025-07-14

## 2025-08-19 LAB
ANION GAP SERPL CALCULATED.4IONS-SCNC: 10 MMOL/L (CALC) (ref 7–17)
BUN SERPL-MCNC: 16 MG/DL (ref 7–25)
BUN/CREAT SERPL: NORMAL (CALC) (ref 6–22)
CALCIUM SERPL-MCNC: 10 MG/DL (ref 8.6–10.4)
CHLORIDE SERPL-SCNC: 103 MMOL/L (ref 98–110)
CO2 SERPL-SCNC: 27 MMOL/L (ref 20–32)
CREAT SERPL-MCNC: 0.76 MG/DL (ref 0.5–1.05)
EGFRCR SERPLBLD CKD-EPI 2021: 85 ML/MIN/1.73M2
ERYTHROCYTE [DISTWIDTH] IN BLOOD BY AUTOMATED COUNT: 13.8 % (ref 11–15)
GLUCOSE SERPL-MCNC: 83 MG/DL (ref 65–99)
HCT VFR BLD AUTO: 48.1 % (ref 35–45)
HGB BLD-MCNC: 15.6 G/DL (ref 11.7–15.5)
MCH RBC QN AUTO: 31.1 PG (ref 27–33)
MCHC RBC AUTO-ENTMCNC: 32.4 G/DL (ref 32–36)
MCV RBC AUTO: 96 FL (ref 80–100)
PLATELET # BLD AUTO: 197 THOUSAND/UL (ref 140–400)
PMV BLD REES-ECKER: 10.4 FL (ref 7.5–12.5)
POTASSIUM SERPL-SCNC: 4.1 MMOL/L (ref 3.5–5.3)
RBC # BLD AUTO: 5.01 MILLION/UL (ref 3.8–5.1)
SODIUM SERPL-SCNC: 140 MMOL/L (ref 135–146)
WBC # BLD AUTO: 5.3 THOUSAND/UL (ref 3.8–10.8)

## 2025-08-21 ENCOUNTER — HOSPITAL ENCOUNTER (OUTPATIENT)
Dept: RADIOLOGY | Facility: CLINIC | Age: 70
Discharge: HOME | End: 2025-08-21
Payer: MEDICARE

## 2025-08-21 DIAGNOSIS — K40.90 REDUCIBLE LEFT INGUINAL HERNIA: ICD-10-CM

## 2025-08-21 DIAGNOSIS — R10.9 ABDOMINAL WALL PAIN: ICD-10-CM

## 2025-08-21 PROCEDURE — 74177 CT ABD & PELVIS W/CONTRAST: CPT

## 2025-08-21 PROCEDURE — 2550000001 HC RX 255 CONTRASTS: Performed by: SURGERY

## 2025-08-21 RX ADMIN — IOHEXOL 75 ML: 350 INJECTION, SOLUTION INTRAVENOUS at 09:30

## 2025-09-03 ENCOUNTER — TELEPHONE (OUTPATIENT)
Dept: SURGERY | Facility: CLINIC | Age: 70
End: 2025-09-03
Payer: MEDICARE

## 2025-09-03 ENCOUNTER — DOCUMENTATION (OUTPATIENT)
Dept: SURGERY | Facility: HOSPITAL | Age: 70
End: 2025-09-03
Payer: MEDICARE

## 2025-10-20 ENCOUNTER — APPOINTMENT (OUTPATIENT)
Dept: PODIATRY | Facility: CLINIC | Age: 70
End: 2025-10-20
Payer: MEDICARE

## 2025-12-03 ENCOUNTER — APPOINTMENT (OUTPATIENT)
Dept: OPHTHALMOLOGY | Facility: CLINIC | Age: 70
End: 2025-12-03
Payer: MEDICARE

## 2025-12-09 ENCOUNTER — APPOINTMENT (OUTPATIENT)
Dept: PRIMARY CARE | Facility: CLINIC | Age: 70
End: 2025-12-09
Payer: MEDICARE